# Patient Record
Sex: MALE | Race: WHITE | NOT HISPANIC OR LATINO | Employment: OTHER | URBAN - METROPOLITAN AREA
[De-identification: names, ages, dates, MRNs, and addresses within clinical notes are randomized per-mention and may not be internally consistent; named-entity substitution may affect disease eponyms.]

---

## 2018-05-17 ENCOUNTER — HOSPITAL ENCOUNTER (EMERGENCY)
Facility: HOSPITAL | Age: 53
Discharge: HOME | End: 2018-05-18
Attending: EMERGENCY MEDICINE | Admitting: EMERGENCY MEDICINE
Payer: COMMERCIAL

## 2018-05-17 DIAGNOSIS — G43.909 MIGRAINE: Primary | ICD-10-CM

## 2018-05-17 PROCEDURE — 96365 THER/PROPH/DIAG IV INF INIT: CPT

## 2018-05-17 PROCEDURE — 96375 TX/PRO/DX INJ NEW DRUG ADDON: CPT

## 2018-05-17 RX ORDER — MAGNESIUM SULFATE HEPTAHYDRATE 40 MG/ML
2 INJECTION, SOLUTION INTRAVENOUS ONCE
Status: COMPLETED | OUTPATIENT
Start: 2018-05-17 | End: 2018-05-17

## 2018-05-17 RX ORDER — KETOROLAC TROMETHAMINE 30 MG/ML
30 INJECTION, SOLUTION INTRAMUSCULAR; INTRAVENOUS ONCE
Status: COMPLETED | OUTPATIENT
Start: 2018-05-17 | End: 2018-05-17

## 2018-05-17 RX ORDER — CYCLOBENZAPRINE HCL 10 MG
10 TABLET ORAL 3 TIMES DAILY PRN
COMMUNITY

## 2018-05-17 RX ORDER — METOCLOPRAMIDE HYDROCHLORIDE 5 MG/ML
10 INJECTION INTRAMUSCULAR; INTRAVENOUS ONCE
Status: COMPLETED | OUTPATIENT
Start: 2018-05-17 | End: 2018-05-17

## 2018-05-17 RX ORDER — DIPHENHYDRAMINE HYDROCHLORIDE 50 MG/ML
25 INJECTION INTRAMUSCULAR; INTRAVENOUS ONCE
Status: COMPLETED | OUTPATIENT
Start: 2018-05-17 | End: 2018-05-17

## 2018-05-17 RX ADMIN — DIPHENHYDRAMINE HYDROCHLORIDE 25 MG: 50 INJECTION, SOLUTION INTRAMUSCULAR; INTRAVENOUS at 23:14

## 2018-05-17 RX ADMIN — SODIUM CHLORIDE 1000 ML: 0.9 INJECTION, SOLUTION INTRAVENOUS at 22:52

## 2018-05-17 RX ADMIN — MAGNESIUM SULFATE HEPTAHYDRATE 2 G: 40 INJECTION, SOLUTION INTRAVENOUS at 22:56

## 2018-05-17 RX ADMIN — KETOROLAC TROMETHAMINE 30 MG: 30 INJECTION, SOLUTION INTRAMUSCULAR at 22:52

## 2018-05-17 RX ADMIN — METOCLOPRAMIDE HYDROCHLORIDE 10 MG: 5 INJECTION INTRAMUSCULAR; INTRAVENOUS at 22:55

## 2018-05-18 VITALS
BODY MASS INDEX: 33.97 KG/M2 | OXYGEN SATURATION: 98 % | TEMPERATURE: 98.2 F | WEIGHT: 230 LBS | DIASTOLIC BLOOD PRESSURE: 68 MMHG | HEART RATE: 70 BPM | RESPIRATION RATE: 16 BRPM | SYSTOLIC BLOOD PRESSURE: 134 MMHG

## 2018-05-18 PROCEDURE — 99283 EMERGENCY DEPT VISIT LOW MDM: CPT

## 2018-05-18 NOTE — DISCHARGE INSTRUCTIONS
Migraine Headache   WHAT YOU SHOULD KNOW:   A migraine is a severe headache  The pain can be so severe that it interferes with your daily activities  A migraine can last a few hours up to several days  The exact cause of migraines is not known  It may be caused by changes in your body chemicals and extra sensitive nerves in your brain  AFTER YOU LEAVE:   Medicines:  Take medicine as soon as you feel a migraine begin  · Pain medicine: You may need medicine to take away or decrease pain  You may need a doctor's order for this medicine  Do not wait until the pain is severe before you take your medicine  · Migraine medicines: These are used to help prevent a migraine or stop it once it starts  · Antinausea medicine: This medicine may be given to calm your stomach and to help prevent vomiting  They can also help relieve pain  · Take your medicine as directed  Call your healthcare provider if you think your medicine is not helping or if you have side effects  Tell him if you are allergic to any medicine  Keep a list of the medicines, vitamins, and herbs you take  Include the amounts, and when and why you take them  Bring the list or the pill bottles to follow-up visits  Carry your medicine list with you in case of an emergency  Manage your symptoms:   · Rest:  Rest in a dark, quiet room  This will help decrease your pain  · Ice:  Ice helps decrease pain  Use an ice pack or put crushed ice in a plastic bag  Cover the ice pack with a towel and place it on your head where it hurts for 15 to 20 minutes every hour  · Heat:  Heat helps decrease pain and muscle spasms  Use a small towel dampened with warm water or a heating pad, or sit in a warm bath  Apply heat on the area for 20 to 30 minutes every 2 hours  You may alternate heat and ice  Keep a headache diary:  Write down when your migraines start and stop  Include your symptoms and what you were doing when a migraine began   Record what you ate or drank for 24 hours before the migraine started  Describe the pain and where it hurts  Keep track of what you did to treat your migraine and whether it worked  Follow up with your primary healthcare provider or neurologist as directed:  Bring your headache diary with you when you see your primary healthcare provider  Write down your questions so you remember to ask them during your visits  Prevent another migraine:   · Do not smoke: If you smoke, it is never too late to quit  Tobacco smoke can trigger a migraine  It can also cause heart disease, lung disease, cancer, and other health problems  Quitting smoking will improve your health and the health of those around you  If you smoke, ask for information about how to stop  · Do not drink alcohol:  Alcohol can trigger a migraine  It can also interfere with the medicines used to treat your migraine  · Get regular exercise:  Exercise may help prevent migraines  Talk to your primary healthcare provider about the best exercise plan for you  · Manage stress:  Stress may trigger a migraine  Learn new ways to relax, such as deep breathing  · Stick to a sleep schedule:  Go to bed and get up at the same time each day  · Eat regular meals:  Include healthy foods such as include fruit, vegetables, whole-grain breads, low-fat dairy products, beans, lean meat, and fish  Avoid trigger foods like chocolate, hard cheese, and red wine  Foods that contain gluten, nitrates, MSG, or artificial sweeteners may also trigger migraines  Caffeine, which is often used to treat migraines, can also trigger them  Contact your primary healthcare provider or neurologist if:   · You have a fever  · Your migraines interfere with your daily activities  · Your medicines or treatments stop working  · You have questions about your condition or care    Seek care immediately or call 911 if:   · You have a headache that seems different or much worse than your usual migraine headache  · You have a severe headache with a fever or a stiff neck  · You have new problems with speech, vision, balance, or movement  · You feel like you are going to faint, you become confused, or you have a seizure  © 2014 [address] is for End User's use only and may not be sold, redistributed or otherwise used for commercial purposes  All illustrations and images included in CareNotes® are the copyrighted property of [employer]  or [employer]  The above information is an  only  It is not intended as medical advice for individual conditions or treatments  Talk to your doctor, nurse or pharmacist before following any medical regimen to see if it is safe and effective for you

## 2018-05-18 NOTE — ED PROVIDER NOTES
History  Chief Complaint   Patient presents with    Migraine     for 2 days    Nausea     for two days     65-year-old male with a history of migraines presents with complaints of 2 days of migraine headache with photophobia  Patient states he has not had a migraine for almost a year  Normally managed with over-the-counter medication  No fever, no trauma, no change in vision, no recent travel, + nausea but no vomiting  History of diabetes, blood sugar 140s        History provided by:  Patient  Migraine   Severity:  Severe  Onset quality:  Gradual  Duration:  2 days  Timing:  Constant  Progression:  Unchanged  Chronicity:  Recurrent  Relieved by:  Nothing  Worsened by:  Lights and sounds  Ineffective treatments:  Over-the-counter medication  Associated symptoms: headaches and nausea    Associated symptoms: no chest pain, no congestion, no cough, no diarrhea, no ear pain, no fatigue, no fever, no loss of consciousness, no myalgias, no rash, no rhinorrhea, no shortness of breath, no sore throat, no vomiting and no wheezing    Nausea   The primary symptoms include nausea  Primary symptoms do not include fever, fatigue, vomiting, diarrhea, myalgias or rash  The illness does not include chills  Prior to Admission Medications   Prescriptions Last Dose Informant Patient Reported? Taking?    DULoxetine (CYMBALTA) 30 mg delayed release capsule 5/17/2018 at Unknown time  Yes Yes   Sig: Take 90 mg by mouth daily   FENOFIBRATE PO 5/17/2018 at Unknown time  Yes Yes   Sig: Take by mouth   Glucos-Giuseppe-MSM-Is-I-UxNw-SeCu (DURAFLEX PO) Past Week at Unknown time  Yes Yes   Sig: Take 10 mg by mouth daily   METFORMIN HCL ER, MOD, PO 5/17/2018 at Unknown time  Yes Yes   Sig: Take by mouth   cyclobenzaprine (FLEXERIL) 10 mg tablet 5/17/2018 at Unknown time  Yes Yes   Sig: Take 10 mg by mouth 3 (three) times a day as needed for muscle spasms   naproxen (NAPROSYN) 500 mg tablet   No No   Sig: Take 1 tablet by mouth 2 (two) times a day with meals for 10 days      Facility-Administered Medications: None       Past Medical History:   Diagnosis Date    Diabetes mellitus ([facility] [address] )     Migraine        Past Surgical History:   Procedure Laterality Date    HIP SURGERY      KNEE SURGERY      SHOULDER SURGERY         History reviewed  No pertinent family history  I have reviewed and agree with the history as documented  Social History   Substance Use Topics    Smoking status: Current Every Day Smoker     Types: Cigars    Smokeless tobacco: Never Used    Alcohol use No        Review of Systems   Constitutional: Negative for chills, fatigue and fever  HENT: Negative for congestion, ear pain, rhinorrhea and sore throat  Respiratory: Negative for cough, shortness of breath and wheezing  Cardiovascular: Negative for chest pain  Gastrointestinal: Positive for nausea  Negative for diarrhea and vomiting  Genitourinary: Negative  Musculoskeletal: Negative  Negative for myalgias and neck pain  Skin: Negative for rash  Neurological: Positive for headaches  Negative for loss of consciousness and speech difficulty  Hematological: Negative  Psychiatric/Behavioral: Negative  All other systems reviewed and are negative  Physical Exam  ED Triage Vitals [05/17/18 2209]   Temperature Pulse Respirations Blood Pressure SpO2   98 5 °F (36 9 °C) 75 20 140/87 98 %      Temp Source Heart Rate Source Patient Position - Orthostatic VS BP Location FiO2 (%)   Tympanic Monitor Sitting Right arm --      Pain Score       Worst Possible Pain           Orthostatic Vital Signs  Vitals:    05/17/18 2209   BP: 140/87   Pulse: 75   Patient Position - Orthostatic VS: Sitting       Physical Exam   Constitutional: He is oriented to person, place, and time  He appears well-developed and well-nourished  He appears distressed  HENT:   Head: Normocephalic and atraumatic     Right Ear: External ear normal    Left Ear: External ear normal    Nose: Nose normal    Mouth/Throat: Oropharynx is clear and moist    Eyes: Conjunctivae, EOM and lids are normal    Photophobia   Neck: Normal range of motion  Neck supple  Cardiovascular: Normal rate, regular rhythm, normal heart sounds and intact distal pulses  Pulmonary/Chest: Effort normal and breath sounds normal    Abdominal: Soft  Bowel sounds are normal    Musculoskeletal: Normal range of motion  Neurological: He is alert and oriented to person, place, and time  Skin: Skin is warm  Capillary refill takes less than 2 seconds  Psychiatric: He has a normal mood and affect  His behavior is normal  Judgment and thought content normal    Nursing note and vitals reviewed  ED Medications  Medications   ketorolac (TORADOL) injection 30 mg (30 mg Intravenous Given 5/17/18 2252)   sodium chloride 0 9 % bolus 1,000 mL (0 mL Intravenous Stopped 5/17/18 2352)   magnesium sulfate 2 g/50 mL IVPB (premix) 2 g (0 g Intravenous Stopped 5/17/18 2352)   metoclopramide (REGLAN) injection 10 mg (10 mg Intravenous Given 5/17/18 2255)   diphenhydrAMINE (BENADRYL) injection 25 mg (25 mg Intravenous Given 5/17/18 2314)       Diagnostic Studies  Results Reviewed     None                 No orders to display              Procedures  Procedures       Phone Contacts  ED Phone Contact    ED Course                               MDM  CritCare Time    Disposition  Final diagnoses:   Migraine     Time reflects when diagnosis was documented in both MDM as applicable and the Disposition within this note     Time User Action Codes Description Comment    5/18/2018 12:07 AM Rita Eaton Add [[id number]] Migraine       ED Disposition     ED Disposition Condition Comment    Discharge  Cory Altamirano discharge to home/self care      Condition at discharge: Stable        Follow-up Information     Follow up With Specialties Details Why Contact Tristen Kebede DO Family Medicine Schedule an appointment as soon as possible for a visit As needed [address] [facility]  [phone]          Patient's Medications   Discharge Prescriptions    No medications on file     No discharge procedures on file      ED Provider  Electronically Signed by           Maddy Mtz MD  05/18/18 0018

## 2019-09-24 ENCOUNTER — TRANSCRIBE ORDERS (OUTPATIENT)
Dept: ADMINISTRATIVE | Facility: HOSPITAL | Age: 54
End: 2019-09-24

## 2019-09-24 ENCOUNTER — HOSPITAL ENCOUNTER (OUTPATIENT)
Dept: RADIOLOGY | Facility: HOSPITAL | Age: 54
Discharge: HOME | End: 2019-09-24
Payer: COMMERCIAL

## 2019-09-24 DIAGNOSIS — I83.811 VARICOSE VEINS OF RIGHT LOWER EXTREMITY WITH PAIN: Primary | ICD-10-CM

## 2019-09-24 DIAGNOSIS — I83.811 VARICOSE VEINS OF RIGHT LOWER EXTREMITY WITH PAIN: ICD-10-CM

## 2019-09-24 PROCEDURE — 93971 EXTREMITY STUDY: CPT | Performed by: SURGERY

## 2019-09-24 PROCEDURE — 93971 EXTREMITY STUDY: CPT

## 2021-03-10 DIAGNOSIS — Z23 ENCOUNTER FOR IMMUNIZATION: ICD-10-CM

## 2022-07-19 ENCOUNTER — OFFICE VISIT (OUTPATIENT)
Dept: DERMATOLOGY | Age: 57
End: 2022-07-19
Payer: COMMERCIAL

## 2022-07-19 VITALS — BODY MASS INDEX: 30.21 KG/M2 | WEIGHT: 211 LBS | HEIGHT: 70 IN | TEMPERATURE: 96.4 F

## 2022-07-19 DIAGNOSIS — L57.0 KERATOSIS, ACTINIC: ICD-10-CM

## 2022-07-19 DIAGNOSIS — Z85.828 HISTORY OF BASAL CELL CARCINOMA: Primary | ICD-10-CM

## 2022-07-19 DIAGNOSIS — L28.1 PRURIGO NODULARIS: ICD-10-CM

## 2022-07-19 PROCEDURE — 17003 DESTRUCT PREMALG LES 2-14: CPT | Performed by: DERMATOLOGY

## 2022-07-19 PROCEDURE — 17000 DESTRUCT PREMALG LESION: CPT | Performed by: DERMATOLOGY

## 2022-07-19 PROCEDURE — 99203 OFFICE O/P NEW LOW 30 MIN: CPT | Performed by: DERMATOLOGY

## 2022-07-19 NOTE — PATIENT INSTRUCTIONS
MELANOCYTIC NEVI ("Moles")    Physical Exam:  Anatomic Location Affected:   Mostly on sun-exposed areas of the trunk and extremities  Morphological Description:  Scattered, 1-4mm round to ovoid, symmetrical-appearing, even bordered, skin colored to dark brown macules/papules, mostly in sun-exposed areas  Pertinent Positives:  Pertinent Negatives: Additional History of Present Condition:      Assessment and Plan:  Based on a thorough discussion of this condition and the management approach to it (including a comprehensive discussion of the known risks, side effects and potential benefits of treatment), the patient (family) agrees to implement the following specific plan:  When outside we recommend using a wide brim hat, sunglasses, long sleeve and pants, sunscreen with SPF 71+ with reapplication every 2 hours, or SPF specific clothing   Benign, reassured  Annual skin check     Melanocytic Nevi  Melanocytic nevi ("moles") are tan or brown, raised or flat areas of the skin which have an increased number of melanocytes  Melanocytes are the cells in our body which make pigment and account for skin color  Some moles are present at birth (I e , "congenital nevi"), while others come up later in life (i e , "acquired nevi")  The sun can stimulate the body to make more moles  Sunburns are not the only thing that triggers more moles  Chronic sun exposure can do it too  Clinically distinguishing a healthy mole from melanoma may be difficult, even for experienced dermatologists  The "ABCDE's" of moles have been suggested as a means of helping to alert a person to a suspicious mole and the possible increased risk of melanoma  The suggestions for raising alert are as follows:    Asymmetry: Healthy moles tend to be symmetric, while melanomas are often asymmetric  Asymmetry means if you draw a line through the mole, the two halves do not match in color, size, shape, or surface texture   Asymmetry can be a result of rapid enlargement of a mole, the development of a raised area on a previously flat lesion, scaling, ulceration, bleeding or scabbing within the mole  Any mole that starts to demonstrate "asymmetry" should be examined promptly by a board certified dermatologist      Border: Healthy moles tend to have discrete, even borders  The border of a melanoma often blends into the normal skin and does not sharply delineate the mole from normal skin  Any mole that starts to demonstrate "uneven borders" should be examined promptly by a board certified dermatologist      Color: Healthy moles tend to be one color throughout  Melanomas tend to be made up of different colors ranging from dark black, blue, white, or red  Any mole that demonstrates a color change should be examined promptly by a board certified dermatologist      Diameter: Healthy moles tend to be smaller than 0 6 cm in size; an exception are "congenital nevi" that can be larger  Melanomas tend to grow and can often be greater than 0 6 cm (1/4 of an inch, or the size of a pencil eraser)  This is only a guideline, and many normal moles may be larger than 0 6 cm without being unhealthy  Any mole that starts to change in size (small to bigger or bigger to smaller) should be examined promptly by a board certified dermatologist      Evolving: Healthy moles tend to "stay the same "  Melanomas may often show signs of change or evolution such as a change in size, shape, color, or elevation    Any mole that starts to itch, bleed, crust, burn, hurt, or ulcerate or demonstrate a change or evolution should be examined promptly by a board certified dermatologist         HISTORY OF BASAL CELL CARCINOMA    Assessment and Plan:  Based on a thorough discussion of this condition and the management approach to it (including a comprehensive discussion of the known risks, side effects and potential benefits of treatment), the patient (family) agrees to implement the following specific plan:  When outside we recommend using a wide brim hat, sunglasses, long sleeve and pants, sunscreen with SPF 68+ with reapplication every 2 hours, or SPF specific clothing     How can basal cell carcinoma be prevented? The most important way to prevent BCC is to avoid sunburn  This is especially important in childhood and early life  Fair skinned individuals and those with a personal or family history of BCC should protect their skin from sun exposure daily, year-round and lifelong  Stay indoors or under the shade in the middle of the day   Wear covering clothing   Apply high protection factor SPF50+ broad-spectrum sunscreens generously to exposed skin if outdoors   Avoid indoor tanning (sun beds, solaria)  Oral nicotinamide (vitamin B3) in a dose of 500 mg twice daily may reduce the number and severity of BCCs  What is the outlook for basal cell carcinoma? Most BCCs are cured by treatment  Cure is most likely if treatment is undertaken when the lesion is small  About 50% of people with BCC develop a second one within 3 years of the first  They are also at increased risk of other skin cancers, especially melanoma  Regular self-skin examinations and long-term annual skin checks by an experienced health professional are recommended  ACTINIC KERATOSIS    Assessment and Plan:  Based on a thorough discussion of this condition and the management approach to it (including a comprehensive discussion of the known risks, side effects and potential benefits of treatment), the patient (family) agrees to implement the following specific plan:    Treatment of liquid nitrogen applied today's  For the spots treated with liquid nitrogen today, expect them to stay red and become crusty over the course of the next 7-10 days, and then the crust should fall off  If you develop a small blister in the area, this is normal  Use Vaseline for irritation      Actinic keratoses are very common on sites repeatedly exposed to the sun, especially the backs of the hands and the face, most often affecting the ears, nose, cheeks, upper lip, vermilion of the lower lip, temples, forehead and balding scalp  In severely chronically sun-damaged individuals, they may also be found on the upper trunk, upper and lower limbs, and dorsum of feet  We discussed the theoretical premalignant (pre-cancerous) nature and etiology of these growths  We discussed the prevailing notion that actinic keratoses are a reflection of abnormal skin cell development due to DNA damage by short wavelength UVB  They are more likely to appear if the immune function is poor, due to aging, recent sun exposure, predisposing disease or certain drugs  We discussed that the main concern is that actinic keratoses may predispose to squamous cell carcinoma  It is rare for a solitary actinic keratosis to evolve to squamous cell carcinoma (SCC), but the risk of SCC occurring at some stage in a patient with more than 10 actinic keratoses is thought to be about 10 to 15%  A tender, thickened, ulcerated or enlarging actinic keratosis is suspicious of SCC  Actinic keratoses may be prevented by strict sun protection  If already present, keratoses may improve with a very high sun protection factor (50+) broad-spectrum sunscreen applied at least daily to affected areas, year-round  We recommend that UPF-rated clothing and hats and sunglasses be worn whenever possible and that a sunscreen-moisturizer combination product such as Neutrogena Daily Defense be applied at least three times a day      We performed a thorough discussion of treatment options and specific risk/benefits/alternatives including but not limited to medical field treatment with medications such as the following:    Topical field area medications such as 5-fluorouracil or Aldara (specifically, the trouble with long-term compliance, blistering and local skin reaction versus the convenience of at-home therapy and that field therapy gets what is not yet seen)  Cryotherapy (specifically, local pain, scarring, dyspigmentation, blistering, possible superinfection, and treats only what we see versus directed treatment today)  Photodynamic therapy (specifically, local pain, scarring, dyspigmentation, blistering, possible superinfection, need to schedule for a later date, and time spent in the office versus field therapy that gets what is not yet seen)  PROCEDURE:  DESTRUCTION OF PRE-MALIGNANT LESIONS  After a thorough discussion of treatment options and risk/benefits/alternatives (including but not limited to local pain, scarring, dyspigmentation, blistering, and possible superinfection), verbal and written consent were obtained and the aforementioned lesions were treated on with cryotherapy using liquid nitrogen x 1 cycle for 5-10 seconds  The patient tolerated the procedure well, and after-care instructions were provided  PRURIGO NODULARIS    Assessment and Plan:  Based on a thorough discussion of this condition and the management approach to it (including a comprehensive discussion of the known risks, side effects and potential benefits of treatment), the patient (family) agrees to implement the following specific plan:  Clobetasol 0 05% cream apply topically to the forearms 2 times daily for 4 weeks  What is nodular prurigo? Nodular prurigo is a skin condition characterised by very itchy firm lumps  It is the most severe form of prurigo  It is not known why these lumps appear  It is also called prurigo nodularis  Nodular prurigo is very difficult to treat effectively  Who gets nodular prurigo? Nodular prurigo can occur at all ages but mainly in adults aged 19-56 years  Both sexes are equally affected  Up to 80% of patients have a personal or family history of atopic dermatitis, asthma or hay fever (compared to about 25% of the normal population)   It has been associated with internal disease including iron deficiency anaemia, chronic renal failure, gluten enteropathy, HIV infection and many other diverse conditions  What is the cause of nodular prurigo? The cause of nodular prurigo is unknown  It is uncertain whether scratching leads to the nodules or if the nodules appear before they are scratched  The reason for the nodules, the inflammation and the increased activity and size of nerves in the skin is under investigation but remains unknown  Nodular prurigo may commence as an insect bite reaction or another form of dermatitis  In some cases, nodular prurigo has been associated with brachioradial pruritus, which is due to compression or traction of spinal nerves  This theory may explain why local treatment is not always successful  It has been speculated that widespread nodular prurigo may also follow sensitisation of the spinal nerves and that the nodules appear because of scratching  What are the clinical features of nodular prurigo? The individual prurigo nodule is a firm lump, 1-3 cm in diameter, often with a raised warty surface  The early lesion may start as a smaller red itchy bump  Crusting and scaling may cover recently scratched lesions  Older lesions may be darker or paler than surrounding skin  The skin in between the nodules is often dry  The itch is often very intense, often for hours on end, leading to vigorous scratching and sometimes secondary infection  Nodular prurigo lesions are usually grouped and numerous but may vary in number from 2-200  Nodular prurigo tends to be symmetrically distributed  They usually start on the lower arms and legs and are worse on the outer aspects  The trunk, face and even palms can also be affected  Sometimes the prurigo nodules are most obvious on the cape area (neck, shoulders and upper arms)  New nodules appear from time to time, but existing nodules may regress spontaneously to leave scars   Nodular prurigo often runs a long course and can lead to significant stress and depression  How is nodular prurigo diagnosed? In most cases, the diagnosis is made clinically due to the degree of itch and the typical appearance of the nodules  A skin biopsy may be useful to confirm the diagnosis of nodular prurigo  Under the microscope, the skin is enormously thickened and may appear quite abnormal, sometimes resembling squamous cell skin cancer  The nerve fibres and nerve endings in the skin are markedly increased in size  The skin is inflamed and there is an increased number of neural mediators known to cause itching and nerve growth  Direct immunofluorescence looking for antibody deposition in the skin is usually negative  Rarely, the blistering disease bullous pemphigoid can present as nodular prurigo (pemphigoid nodularis)  In this case, immunofluorescence reveals immunoglobulins in the basement membrane zone below the epidermis  The prurigo nodules can be present for weeks or months before any blisters appear  It is important to identify underlying diseases that are associated with nodular prurigo; blood tests may include full blood count, liver, kidney and thyroid function tests  Patch testing may be worthwhile if contact allergy is considered possible  What is the treatment for nodular prurigo? Unfortunately, nodular prurigo is one of the more resistant conditions skin specialists are called upon to treat  Local treatments that may be tried include:  Emollients applied liberally and frequently to cool and soothe itchy skin - menthol or phenol may be added  Oral antihistamines at night to reduce itch and allow sleep  Ultrapotent topical steroid creams  To enhance their effect, apply under occlusion; cover with a plastic or hydrocolloid adhesive dressing and leave this in place for several days  Corticosteroid injections (triamcinolone acetonide 10-40 mg/mL) into thicker nodules     Coal tar ointment as a steroid alternative  Calcipotriol ointment (topical vitamin D3), usually used for psoriasis, can be applied twice daily  Capsaicin cream, which induces itching and burning until eventually, the itch stops completely -- it requires repeated applications four to six times daily  Cryotherapy, which may shrink the nodules and reduce their itch  Treatments with pulsed dye laser, which may reduce the vascularity of individual lesions  Antiseptic or antibiotic ointment may be used on infected nodules, and oral antibiotics (usually flucloxacillin) are indicated for significant secondary bacterial infection (cellulitis)  Systemic treatments that may be helpful for more severe disease are listed below, in no particular order  Combination treatment is frequently recommended  Phototherapy (UVB and PUVA)   Tricyclic antidepressants such as amitriptyline or doxepin   Anticonvulsants used for neuropathic pain and itch, such as gabapentin or pregabalin   Naltrexone, an opiate antagonist (this counteracts the narcotic effect of morphine, heroin and similar drugs), has been reported to reduce itching in some subjects  Oral steroids   Methotrexate   Thalidomide, which is reserved for very severe cases and may be difficult to access  Ciclosporin, which may reduce the lumps and the itching but its use is limited by side effects  Systemic retinoids such as acitretin or isotretinoin, which may shrink the nodules and reduce the severity of the itch

## 2022-07-19 NOTE — PROGRESS NOTES
[address] [facility] Note     Patient Name: Samantha Briseno  Encounter Date: 7/19/22     Have you been cared for by a [facility] Dermatologist in the last 3 years and, if so, which one? No    · Have you traveled outside of the [address] [place] [address] in the past 3 months or outside of the [facility] area in the last 2 weeks? No     May we call your Preferred Phone number to discuss your specific medical information? Yes     May we leave a detailed message that includes your specific medical information? Yes      Today's Chief Concerns:   Concern #1:  Skin exam   Concern #2:      Past Medical History:  Have you personally ever had or currently have any of the following? · Skin cancer (such as Melanoma, Basal Cell Carcinoma, Squamous Cell Carcinoma? (If Yes, please provide more detail)- YES, BCC's  · Eczema: No  · Psoriasis: No  · HIV/AIDS: No  · Hepatitis B or C: No  · Tuberculosis: No  · Systemic Immunosuppression such as Diabetes, Biologic or Immunotherapy, Chemotherapy, Organ Transplantation, Bone Marrow Transplantation (If YES, please provide more detail): YES, diabetes  · Radiation Treatment (If YES, please provide more detail): No  · Any other major medical conditions/concerns? (If Yes, which types)- No    Social History:     What is/was your primary occupation? retired        Family History:  Have any of your "first degree relatives" (parent, brother, sister, or child) had any of the following       · Skin cancer such as Melanoma or Merkel Cell Carcinoma or Pancreatic Cancer? No  · Eczema, Asthma, Hay Fever or Seasonal Allergies: No  · Psoriasis or Psoriatic Arthritis: No  · Do any other medical conditions seem to run in your family? If Yes, what condition and which relatives?   No    Current Medications:   (please update all dermatological medications before printing patient's AVS!)      Current Outpatient Medications:     DULoxetine (CYMBALTA) 30 mg delayed release capsule, Take 90 mg by mouth daily, Disp: , Rfl:     FENOFIBRATE PO, Take by mouth, Disp: , Rfl:     METFORMIN HCL ER, MOD, PO, Take by mouth, Disp: , Rfl:     cyclobenzaprine (FLEXERIL) 10 mg tablet, Take 10 mg by mouth 3 (three) times a day as needed for muscle spasms, Disp: , Rfl:     Glucos-Giuseppe-MSM-Zn-C-ZmUf-SeCu (DURAFLEX PO), Take 10 mg by mouth daily, Disp: , Rfl:     naproxen (NAPROSYN) 500 mg tablet, Take 1 tablet by mouth 2 (two) times a day with meals for 10 days, Disp: 20 tablet, Rfl: 0      Review of Systems:  Have you recently had or currently have any of the following? If YES, what are you doing for the problem? · Fever, chills or unintended weight loss: No  · Sudden loss or change in your vision: No  · Nausea, vomiting or blood in your stool: No  · Painful or swollen joints: No  · Wheezing or cough: No  · Changing mole or non-healing wound: No  · Nosebleeds: No  · Excessive sweating: No  · Easy or prolonged bleeding? No  · Over the last 2 weeks, how often have you been bothered by the following problems? · Taking little interest or pleasure in doing things: 1 - Not at All  · Feeling down, depressed, or hopeless: 1 - Not at All  · Rapid heartbeat with epinephrine:  No        · Any known allergies?       Allergies   Allergen Reactions    Biaxin [Clarithromycin]     Duricef [Cefadroxil]     Penicillins Hives, Rash and Swelling   ·       Physical Exam:     Was a chaperone (Derm Clinical Assistant) present throughout the entire Physical Exam? Yes        CONSTITUTIONAL:   Vitals:    07/19/22 1322   Temp: (!) 96 4 °F (35 8 °C)   TempSrc: Temporal   Weight: 95 7 kg (211 lb)   Height: 5' 9 5" (1 765 m)         PSYCH: Normal mood and affect  EYES: Normal conjunctiva  ENT: Normal lips and oral mucosa  CARDIOVASCULAR: No edema  RESPIRATORY: Normal respirations  HEME/LYMPH/IMMUNO:  No regional lymphadenopathy except as noted below in "ASSESSMENT AND PLAN BY DIAGNOSIS"    SKIN:  FULL ORGAN SYSTEM EXAM   Hair, Scalp, Ears, Face Normal except as noted below in Assessment   Neck, Cervical Chain Nodes Normal except as noted below in Assessment   Right Arm/Hand/Fingers Normal except as noted below in Assessment   Left Arm/Hand/Fingers Normal except as noted below in Assessment   Chest/Breasts/Axillae Viewed areas Normal except as noted below in Assessment   Abdomen, Umbilicus Normal except as noted below in Assessment   Back/Spine Normal except as noted below in Assessment   Groin/Genitalia/Buttocks Normal except as noted below in Assessment   Right Leg, Foot, Toes Normal except as noted below in Assessment   Left Leg, Foot, Toes Normal except as noted below in Assessment        Assessment and Plan by Diagnosis:    History of Present Condition:     Duration:  How long has this been an issue for you?    o  patient here for skin exam history of BCC'S there's a few spots patient requesting to be examined   Location Affected:  Where on the body is this affecting you?    o  n/a   Quality:  Is there any bleeding, pain, itch, burning/irritation, or redness associated with the skin lesion?    o  n/a   Severity:  Describe any bleeding, pain, itch, burning/irritation, or redness on a scale of 1 to 10 (with 10 being the worst)  o  n/a   Timing:  Does this condition seem to be there pretty constantly or do you notice it more at specific times throughout the day?     o  consistent   Context:  Have you ever noticed that this condition seems to be associated with specific activities you do?    o  n/a   Modifying Factors:    o Anything that seems to make the condition worse?    -  nothing  o What have you tried to do to make the condition better?    -  nothing   Associated Signs and Symptoms:  Does this skin lesion seem to be associated with any of the following:      MELANOCYTIC NEVI ("Moles")    Physical Exam:   Anatomic Location Affected:   Mostly on sun-exposed areas of the trunk and extremities   Morphological Description:  Scattered, 1-4mm round to ovoid, symmetrical-appearing, even bordered, skin colored to dark brown macules/papules, mostly in sun-exposed areas   Pertinent Positives:   Pertinent Negatives: Additional History of Present Condition:      Assessment and Plan:  Based on a thorough discussion of this condition and the management approach to it (including a comprehensive discussion of the known risks, side effects and potential benefits of treatment), the patient (family) agrees to implement the following specific plan:   When outside we recommend using a wide brim hat, sunglasses, long sleeve and pants, sunscreen with SPF 07+ with reapplication every 2 hours, or SPF specific clothing    Benign, reassured   Annual skin check     Melanocytic Nevi  Melanocytic nevi ("moles") are tan or brown, raised or flat areas of the skin which have an increased number of melanocytes  Melanocytes are the cells in our body which make pigment and account for skin color  Some moles are present at birth (I e , "congenital nevi"), while others come up later in life (i e , "acquired nevi")  The sun can stimulate the body to make more moles  Sunburns are not the only thing that triggers more moles  Chronic sun exposure can do it too  Clinically distinguishing a healthy mole from melanoma may be difficult, even for experienced dermatologists  The "ABCDE's" of moles have been suggested as a means of helping to alert a person to a suspicious mole and the possible increased risk of melanoma  The suggestions for raising alert are as follows:    Asymmetry: Healthy moles tend to be symmetric, while melanomas are often asymmetric  Asymmetry means if you draw a line through the mole, the two halves do not match in color, size, shape, or surface texture   Asymmetry can be a result of rapid enlargement of a mole, the development of a raised area on a previously flat lesion, scaling, ulceration, bleeding or scabbing within the mole  Any mole that starts to demonstrate "asymmetry" should be examined promptly by a board certified dermatologist      Border: Healthy moles tend to have discrete, even borders  The border of a melanoma often blends into the normal skin and does not sharply delineate the mole from normal skin  Any mole that starts to demonstrate "uneven borders" should be examined promptly by a board certified dermatologist      Color: Healthy moles tend to be one color throughout  Melanomas tend to be made up of different colors ranging from dark black, blue, white, or red  Any mole that demonstrates a color change should be examined promptly by a board certified dermatologist      Diameter: Healthy moles tend to be smaller than 0 6 cm in size; an exception are "congenital nevi" that can be larger  Melanomas tend to grow and can often be greater than 0 6 cm (1/4 of an inch, or the size of a pencil eraser)  This is only a guideline, and many normal moles may be larger than 0 6 cm without being unhealthy  Any mole that starts to change in size (small to bigger or bigger to smaller) should be examined promptly by a board certified dermatologist      Evolving: Healthy moles tend to "stay the same "  Melanomas may often show signs of change or evolution such as a change in size, shape, color, or elevation  Any mole that starts to itch, bleed, crust, burn, hurt, or ulcerate or demonstrate a change or evolution should be examined promptly by a board certified dermatologist         HISTORY OF BASAL CELL CARCINOMA    Physical Exam:   Anatomic Location Affected:  Left calf, face hands   Morphological Description of scar:  Well healed   Suspected Recurrence: No   Pertinent Positives:   Pertinent Negatives:       Additional History of Present Condition:  History of basal cell carcinoma with no sign of recurrence    Assessment and Plan:  Based on a thorough discussion of this condition and the management approach to it (including a comprehensive discussion of the known risks, side effects and potential benefits of treatment), the patient (family) agrees to implement the following specific plan:   When outside we recommend using a wide brim hat, sunglasses, long sleeve and pants, sunscreen with SPF 13+ with reapplication every 2 hours, or SPF specific clothing     How can basal cell carcinoma be prevented? The most important way to prevent BCC is to avoid sunburn  This is especially important in childhood and early life  Fair skinned individuals and those with a personal or family history of BCC should protect their skin from sun exposure daily, year-round and lifelong   Stay indoors or under the shade in the middle of the day    Wear covering clothing    Apply high protection factor SPF50+ broad-spectrum sunscreens generously to exposed skin if outdoors    Avoid indoor tanning (sun beds, solaria)   Oral nicotinamide (vitamin B3) in a dose of 500 mg twice daily may reduce the number and severity of BCCs  What is the outlook for basal cell carcinoma? Most BCCs are cured by treatment  Cure is most likely if treatment is undertaken when the lesion is small  About 50% of people with BCC develop a second one within 3 years of the first  They are also at increased risk of other skin cancers, especially melanoma  Regular self-skin examinations and long-term annual skin checks by an experienced health professional are recommended        ACTINIC KERATOSIS    Physical Exam:   Anatomic Location Affected:  cheeks   Morphological Description:  Scaly pink papules    Additional History of Present Condition:  History of BCC's    Assessment and Plan:  Based on a thorough discussion of this condition and the management approach to it (including a comprehensive discussion of the known risks, side effects and potential benefits of treatment), the patient (family) agrees to implement the following specific plan:     Treatment of liquid nitrogen applied today's   For the spots treated with liquid nitrogen today, expect them to stay red and become crusty over the course of the next 7-10 days, and then the crust should fall off  If you develop a small blister in the area, this is normal  Use Vaseline for irritation  Actinic keratoses are very common on sites repeatedly exposed to the sun, especially the backs of the hands and the face, most often affecting the ears, nose, cheeks, upper lip, vermilion of the lower lip, temples, forehead and balding scalp  In severely chronically sun-damaged individuals, they may also be found on the upper trunk, upper and lower limbs, and dorsum of feet  We discussed the theoretical premalignant (pre-cancerous) nature and etiology of these growths  We discussed the prevailing notion that actinic keratoses are a reflection of abnormal skin cell development due to DNA damage by short wavelength UVB  They are more likely to appear if the immune function is poor, due to aging, recent sun exposure, predisposing disease or certain drugs  We discussed that the main concern is that actinic keratoses may predispose to squamous cell carcinoma  It is rare for a solitary actinic keratosis to evolve to squamous cell carcinoma (SCC), but the risk of SCC occurring at some stage in a patient with more than 10 actinic keratoses is thought to be about 10 to 15%  A tender, thickened, ulcerated or enlarging actinic keratosis is suspicious of SCC  Actinic keratoses may be prevented by strict sun protection  If already present, keratoses may improve with a very high sun protection factor (50+) broad-spectrum sunscreen applied at least daily to affected areas, year-round  We recommend that UPF-rated clothing and hats and sunglasses be worn whenever possible and that a sunscreen-moisturizer combination product such as Neutrogena Daily Defense be applied at least three times a day      We performed a thorough discussion of treatment options and specific risk/benefits/alternatives including but not limited to medical field treatment with medications such as the following:     Topical field area medications such as 5-fluorouracil or Aldara (specifically, the trouble with long-term compliance, blistering and local skin reaction versus the convenience of at-home therapy and that field therapy gets what is not yet seen)   Cryotherapy (specifically, local pain, scarring, dyspigmentation, blistering, possible superinfection, and treats only what we see versus directed treatment today)   Photodynamic therapy (specifically, local pain, scarring, dyspigmentation, blistering, possible superinfection, need to schedule for a later date, and time spent in the office versus field therapy that gets what is not yet seen)  PROCEDURE:  DESTRUCTION OF PRE-MALIGNANT LESIONS  After a thorough discussion of treatment options and risk/benefits/alternatives (including but not limited to local pain, scarring, dyspigmentation, blistering, and possible superinfection), verbal and written consent were obtained and the aforementioned lesions were treated on with cryotherapy using liquid nitrogen x 1 cycle for 5-10 seconds   TOTAL NUMBER of 3 pre-malignant lesions were treated today on the ANATOMIC LOCATION: cheeks  The patient tolerated the procedure well, and after-care instructions were provided  PRURIGO NODULARIS    Physical Exam:   Anatomic Location Affected:  Bilateral forearm   Morphological Description:  Pink keratotic papules   Pertinent Positives:   Pertinent Negatives:     Additional History of Present Condition:  Picks at them    Assessment and Plan:  Based on a thorough discussion of this condition and the management approach to it (including a comprehensive discussion of the known risks, side effects and potential benefits of treatment), the patient (family) agrees to implement the following specific plan:  Clobetasol 0 05% cream apply topically to the forearms 2 times daily for 4 weeks  Follow up before next skin exam if not resolved in 4 weeks with medication    What is nodular prurigo? Nodular prurigo is a skin condition characterised by very itchy firm lumps  It is the most severe form of prurigo  It is not known why these lumps appear  It is also called prurigo nodularis  Nodular prurigo is very difficult to treat effectively  Who gets nodular prurigo? Nodular prurigo can occur at all ages but mainly in adults aged 19-56 years  Both sexes are equally affected  Up to 80% of patients have a personal or family history of atopic dermatitis, asthma or hay fever (compared to about 25% of the normal population)  It has been associated with internal disease including iron deficiency anaemia, chronic renal failure, gluten enteropathy, HIV infection and many other diverse conditions  What is the cause of nodular prurigo? The cause of nodular prurigo is unknown  It is uncertain whether scratching leads to the nodules or if the nodules appear before they are scratched  The reason for the nodules, the inflammation and the increased activity and size of nerves in the skin is under investigation but remains unknown  Nodular prurigo may commence as an insect bite reaction or another form of dermatitis  In some cases, nodular prurigo has been associated with brachioradial pruritus, which is due to compression or traction of spinal nerves  This theory may explain why local treatment is not always successful  It has been speculated that widespread nodular prurigo may also follow sensitisation of the spinal nerves and that the nodules appear because of scratching  What are the clinical features of nodular prurigo? The individual prurigo nodule is a firm lump, 1-3 cm in diameter, often with a raised warty surface  The early lesion may start as a smaller red itchy bump   Crusting and scaling may cover recently scratched lesions  Older lesions may be darker or paler than surrounding skin  The skin in between the nodules is often dry  The itch is often very intense, often for hours on end, leading to vigorous scratching and sometimes secondary infection  Nodular prurigo lesions are usually grouped and numerous but may vary in number from 2-200  Nodular prurigo tends to be symmetrically distributed  They usually start on the lower arms and legs and are worse on the outer aspects  The trunk, face and even palms can also be affected  Sometimes the prurigo nodules are most obvious on the cape area (neck, shoulders and upper arms)  New nodules appear from time to time, but existing nodules may regress spontaneously to leave scars  Nodular prurigo often runs a long course and can lead to significant stress and depression  How is nodular prurigo diagnosed? In most cases, the diagnosis is made clinically due to the degree of itch and the typical appearance of the nodules  A skin biopsy may be useful to confirm the diagnosis of nodular prurigo  Under the microscope, the skin is enormously thickened and may appear quite abnormal, sometimes resembling squamous cell skin cancer  The nerve fibres and nerve endings in the skin are markedly increased in size  The skin is inflamed and there is an increased number of neural mediators known to cause itching and nerve growth  Direct immunofluorescence looking for antibody deposition in the skin is usually negative  Rarely, the blistering disease bullous pemphigoid can present as nodular prurigo (pemphigoid nodularis)  In this case, immunofluorescence reveals immunoglobulins in the basement membrane zone below the epidermis  The prurigo nodules can be present for weeks or months before any blisters appear      It is important to identify underlying diseases that are associated with nodular prurigo; blood tests may include full blood count, liver, kidney and thyroid function tests  Patch testing may be worthwhile if contact allergy is considered possible  What is the treatment for nodular prurigo? Unfortunately, nodular prurigo is one of the more resistant conditions skin specialists are called upon to treat  Local treatments that may be tried include:   Emollients applied liberally and frequently to cool and soothe itchy skin - menthol or phenol may be added   Oral antihistamines at night to reduce itch and allow sleep   Ultrapotent topical steroid creams  To enhance their effect, apply under occlusion; cover with a plastic or hydrocolloid adhesive dressing and leave this in place for several days   Corticosteroid injections (triamcinolone acetonide 10-40 mg/mL) into thicker nodules   Coal tar ointment as a steroid alternative   Calcipotriol ointment (topical vitamin D3), usually used for psoriasis, can be applied twice daily   Capsaicin cream, which induces itching and burning until eventually, the itch stops completely -- it requires repeated applications four to six times daily   Cryotherapy, which may shrink the nodules and reduce their itch   Treatments with pulsed dye laser, which may reduce the vascularity of individual lesions     Antiseptic or antibiotic ointment may be used on infected nodules, and oral antibiotics (usually flucloxacillin) are indicated for significant secondary bacterial infection (cellulitis)  Systemic treatments that may be helpful for more severe disease are listed below, in no particular order  Combination treatment is frequently recommended   Phototherapy (UVB and PUVA)    Tricyclic antidepressants such as amitriptyline or doxepin    Anticonvulsants used for neuropathic pain and itch, such as gabapentin or pregabalin    Naltrexone, an opiate antagonist (this counteracts the narcotic effect of morphine, heroin and similar drugs), has been reported to reduce itching in some subjects      Oral steroids  Methotrexate    Thalidomide, which is reserved for very severe cases and may be difficult to access   Ciclosporin, which may reduce the lumps and the itching but its use is limited by side effects   Systemic retinoids such as acitretin or isotretinoin, which may shrink the nodules and reduce the severity of the itch          Scribe Attestation    I,:  Mitali Salmeron am acting as a scribe while in the presence of the attending physician :       I,:  Osmani Azar MD personally performed the services described in this documentation    as scribed in my presence :

## 2022-07-20 ENCOUNTER — TELEPHONE (OUTPATIENT)
Dept: DERMATOLOGY | Facility: CLINIC | Age: 57
End: 2022-07-20

## 2022-07-20 DIAGNOSIS — L28.1 PRURIGO NODULARIS: Primary | ICD-10-CM

## 2022-07-20 RX ORDER — CLOBETASOL PROPIONATE 0.5 MG/G
OINTMENT TOPICAL 2 TIMES DAILY
Qty: 30 G | Refills: 0 | Status: SHIPPED | OUTPATIENT
Start: 2022-07-20

## 2022-07-20 NOTE — TELEPHONE ENCOUNTER
He didn't give us a pharmacy  I sent it to the [facility] in [place], based on his home address

## 2022-07-20 NOTE — TELEPHONE ENCOUNTER
Pt left a v/m about a cream that was supposed to be sent in, c/b but n/a, looking into pt chart it looks like the cream is Clobetasol 0 05%, pt did say in v/m he might stop into the office today  The number to reach him on is [phone]   Thank you

## 2022-08-31 ENCOUNTER — APPOINTMENT (OUTPATIENT)
Dept: RADIOLOGY | Facility: HOSPITAL | Age: 57
End: 2022-08-31
Payer: COMMERCIAL

## 2022-08-31 ENCOUNTER — HOSPITAL ENCOUNTER (EMERGENCY)
Facility: HOSPITAL | Age: 57
Discharge: HOME | End: 2022-08-31
Attending: EMERGENCY MEDICINE
Payer: COMMERCIAL

## 2022-08-31 VITALS
DIASTOLIC BLOOD PRESSURE: 79 MMHG | HEART RATE: 78 BPM | OXYGEN SATURATION: 98 % | RESPIRATION RATE: 16 BRPM | TEMPERATURE: 97 F | SYSTOLIC BLOOD PRESSURE: 133 MMHG

## 2022-08-31 DIAGNOSIS — R10.30 INGUINAL PAIN: Primary | ICD-10-CM

## 2022-08-31 DIAGNOSIS — R59.1 LYMPHADENOPATHY: ICD-10-CM

## 2022-08-31 LAB
ALBUMIN SERPL BCP-MCNC: 4.1 G/DL (ref 3.5–5)
ALP SERPL-CCNC: 60 U/L (ref 46–116)
ALT SERPL W P-5'-P-CCNC: 30 U/L (ref 12–78)
AMORPH URATE CRY URNS QL MICRO: ABNORMAL /HPF
ANION GAP SERPL CALCULATED.3IONS-SCNC: 10 MMOL/L (ref 4–13)
AST SERPL W P-5'-P-CCNC: 12 U/L (ref 5–45)
BACTERIA UR QL AUTO: ABNORMAL /HPF
BASOPHILS # BLD AUTO: 0.06 THOUSANDS/ΜL (ref 0–0.1)
BASOPHILS NFR BLD AUTO: 1 % (ref 0–1)
BILIRUB SERPL-MCNC: 0.39 MG/DL (ref 0.2–1)
BILIRUB UR QL STRIP: NEGATIVE
BUN SERPL-MCNC: 15 MG/DL (ref 5–25)
CALCIUM SERPL-MCNC: 9.5 MG/DL (ref 8.3–10.1)
CHLORIDE SERPL-SCNC: 102 MMOL/L (ref 96–108)
CLARITY UR: CLEAR
CO2 SERPL-SCNC: 27 MMOL/L (ref 21–32)
COLOR UR: YELLOW
CREAT SERPL-MCNC: 1.02 MG/DL (ref 0.6–1.3)
EOSINOPHIL # BLD AUTO: 0.06 THOUSAND/ΜL (ref 0–0.61)
EOSINOPHIL NFR BLD AUTO: 1 % (ref 0–6)
ERYTHROCYTE [DISTWIDTH] IN BLOOD BY AUTOMATED COUNT: 12.9 % (ref 11.6–15.1)
GFR SERPL CREATININE-BSD FRML MDRD: 80 ML/MIN/1.73SQ M
GLUCOSE SERPL-MCNC: 174 MG/DL (ref 65–140)
GLUCOSE UR STRIP-MCNC: ABNORMAL MG/DL
HCT VFR BLD AUTO: 48.3 % (ref 36.5–49.3)
HGB BLD-MCNC: 15.4 G/DL (ref 12–17)
HGB UR QL STRIP.AUTO: NEGATIVE
IMM GRANULOCYTES # BLD AUTO: 0.11 THOUSAND/UL (ref 0–0.2)
IMM GRANULOCYTES NFR BLD AUTO: 1 % (ref 0–2)
KETONES UR STRIP-MCNC: NEGATIVE MG/DL
LEUKOCYTE ESTERASE UR QL STRIP: ABNORMAL
LYMPHOCYTES # BLD AUTO: 2.98 THOUSANDS/ΜL (ref 0.6–4.47)
LYMPHOCYTES NFR BLD AUTO: 28 % (ref 14–44)
MCH RBC QN AUTO: 27.8 PG (ref 26.8–34.3)
MCHC RBC AUTO-ENTMCNC: 31.9 G/DL (ref 31.4–37.4)
MCV RBC AUTO: 87 FL (ref 82–98)
MONOCYTES # BLD AUTO: 0.93 THOUSAND/ΜL (ref 0.17–1.22)
MONOCYTES NFR BLD AUTO: 9 % (ref 4–12)
MUCOUS THREADS UR QL AUTO: ABNORMAL
NEUTROPHILS # BLD AUTO: 6.64 THOUSANDS/ΜL (ref 1.85–7.62)
NEUTS SEG NFR BLD AUTO: 60 % (ref 43–75)
NITRITE UR QL STRIP: NEGATIVE
NON-SQ EPI CELLS URNS QL MICRO: ABNORMAL /HPF
NRBC BLD AUTO-RTO: 0 /100 WBCS
PH UR STRIP.AUTO: 5.5 [PH]
PLATELET # BLD AUTO: 327 THOUSANDS/UL (ref 149–390)
PMV BLD AUTO: 8.2 FL (ref 8.9–12.7)
POTASSIUM SERPL-SCNC: 4.2 MMOL/L (ref 3.5–5.3)
PROT SERPL-MCNC: 7.8 G/DL (ref 6.4–8.4)
PROT UR STRIP-MCNC: NEGATIVE MG/DL
RBC # BLD AUTO: 5.53 MILLION/UL (ref 3.88–5.62)
RBC #/AREA URNS AUTO: ABNORMAL /HPF
SODIUM SERPL-SCNC: 139 MMOL/L (ref 135–147)
SP GR UR STRIP.AUTO: >=1.03 (ref 1–1.03)
UROBILINOGEN UR QL STRIP.AUTO: 0.2 E.U./DL
WBC # BLD AUTO: 10.78 THOUSAND/UL (ref 4.31–10.16)
WBC #/AREA URNS AUTO: ABNORMAL /HPF

## 2022-08-31 PROCEDURE — 85025 COMPLETE CBC W/AUTO DIFF WBC: CPT | Performed by: EMERGENCY MEDICINE

## 2022-08-31 PROCEDURE — 81001 URINALYSIS AUTO W/SCOPE: CPT | Performed by: EMERGENCY MEDICINE

## 2022-08-31 PROCEDURE — 96360 HYDRATION IV INFUSION INIT: CPT

## 2022-08-31 PROCEDURE — 99284 EMERGENCY DEPT VISIT MOD MDM: CPT

## 2022-08-31 PROCEDURE — 36415 COLL VENOUS BLD VENIPUNCTURE: CPT | Performed by: EMERGENCY MEDICINE

## 2022-08-31 PROCEDURE — 99282 EMERGENCY DEPT VISIT SF MDM: CPT | Performed by: EMERGENCY MEDICINE

## 2022-08-31 PROCEDURE — 80053 COMPREHEN METABOLIC PANEL: CPT | Performed by: EMERGENCY MEDICINE

## 2022-08-31 PROCEDURE — 76705 ECHO EXAM OF ABDOMEN: CPT

## 2022-08-31 PROCEDURE — 96361 HYDRATE IV INFUSION ADD-ON: CPT

## 2022-08-31 RX ORDER — OXYCODONE HYDROCHLORIDE AND ACETAMINOPHEN 5; 325 MG/1; MG/1
1 TABLET ORAL EVERY 4 HOURS PRN
COMMUNITY

## 2022-08-31 RX ADMIN — SODIUM CHLORIDE 1000 ML: 0.9 INJECTION, SOLUTION INTRAVENOUS at 13:00

## 2022-08-31 NOTE — ED PROVIDER NOTES
History  Chief Complaint   Patient presents with    Evaluation of Abnormal Diagnostic Test     Had bloodwork done yesterday, states has elevated wbc   Hernia     States injured himself 3 days ago and  got inguinal hernia,states no trouble urinating or moving bowels     Patient states she felt a painful lump in his left groin starting 3 days ago during strenuous sexual activity  He has had no constitutional GI symptoms, no nausea vomiting or bowel changes  Denies any dysuria  Patient states he had the chills but no fever  Patient was seen by his PCP and notified that he had elevated WBC count yesterday  Prior to Admission Medications   Prescriptions Last Dose Informant Patient Reported? Taking?    ATORVASTATIN CALCIUM PO   Yes Yes   Sig: Take by mouth in the morning   DULoxetine (CYMBALTA) 30 mg delayed release capsule   Yes No   Sig: Take 90 mg by mouth daily   FENOFIBRATE PO   Yes No   Sig: Take 145 mg by mouth in the morning   Glucos-Giuseppe-MSM-Lw-C-WqJy-SeCu (DURAFLEX PO)   Yes No   Sig: Take 10 mg by mouth daily   METFORMIN HCL ER, MOD, PO   Yes No   Sig: Take 500 mg by mouth 2 (two) times a day   TIZANIDINE HCL PO   Yes Yes   Sig: Take by mouth daily at bedtime   clobetasol (TEMOVATE) 0 05 % ointment   No No   Sig: Apply topically 2 (two) times a day To bumps on forearms for 4 weeks   cyclobenzaprine (FLEXERIL) 10 mg tablet   Yes No   Sig: Take 10 mg by mouth 3 (three) times a day as needed for muscle spasms   naproxen (NAPROSYN) 500 mg tablet   No No   Sig: Take 1 tablet by mouth 2 (two) times a day with meals for 10 days   oxyCODONE-acetaminophen (PERCOCET) 5-325 mg per tablet   Yes Yes   Sig: Take 1 tablet by mouth every 4 (four) hours as needed for moderate pain      Facility-Administered Medications: None       Past Medical History:   Diagnosis Date    Diabetes mellitus ([address] )     Migraine        Past Surgical History:   Procedure Laterality Date    HIP SURGERY      KNEE SURGERY      SHOULDER SURGERY         History reviewed  No pertinent family history  I have reviewed and agree with the history as documented  E-Cigarette/Vaping    E-Cigarette Use Never User      E-Cigarette/Vaping Substances     Social History     Tobacco Use    Smoking status: Current Every Day Smoker     Types: Cigars    Smokeless tobacco: Never Used   Vaping Use    Vaping Use: Never used   Substance Use Topics    Alcohol use: No    Drug use: No       Review of Systems   Constitutional: Positive for chills  Negative for appetite change and fever  HENT: Negative for congestion and sore throat  Eyes: Negative for visual disturbance  Respiratory: Negative for cough and shortness of breath  Cardiovascular: Negative for chest pain  Gastrointestinal: Positive for abdominal pain  Negative for blood in stool, diarrhea, nausea and vomiting  Genitourinary: Negative for difficulty urinating and dysuria  Musculoskeletal: Negative for back pain  Skin: Negative for rash  Neurological: Negative for headaches  Hematological: Does not bruise/bleed easily  Psychiatric/Behavioral: Negative for confusion  All other systems reviewed and are negative  Physical Exam  Physical Exam  Vitals and nursing note reviewed  Constitutional:       Appearance: Normal appearance  HENT:      Head: Normocephalic  Right Ear: External ear normal       Left Ear: External ear normal       Nose: Nose normal       Mouth/Throat:      Pharynx: Oropharynx is clear  Eyes:      Conjunctiva/sclera: Conjunctivae normal    Cardiovascular:      Rate and Rhythm: Normal rate and regular rhythm  Pulses: Normal pulses  Pulmonary:      Effort: Pulmonary effort is normal    Abdominal:      Palpations: Abdomen is soft  Tenderness: There is no abdominal tenderness  Genitourinary:     Comments: There is marked tenderness in the left inguinal canal with a firm tender lymph node laterally    No hernias palpated  Musculoskeletal:         General: Normal range of motion  Cervical back: Normal range of motion  Skin:     General: Skin is warm and dry  Capillary Refill: Capillary refill takes less than 2 seconds  Neurological:      General: No focal deficit present  Mental Status: He is alert and oriented to person, place, and time  Psychiatric:         Mood and Affect: Mood normal          Behavior: Behavior normal          Vital Signs  ED Triage Vitals [08/31/22 1154]   Temperature Pulse Respirations Blood Pressure SpO2   (!) 97 °F (36 1 °C) 78 16 133/79 98 %      Temp Source Heart Rate Source Patient Position - Orthostatic VS BP Location FiO2 (%)   Tympanic Monitor Sitting Right arm --      Pain Score       No Pain           Vitals:    08/31/22 1154   BP: 133/79   Pulse: 78   Patient Position - Orthostatic VS: Sitting         Visual Acuity      ED Medications  Medications   sodium chloride 0 9 % bolus 1,000 mL (1,000 mL Intravenous New Bag 8/31/22 1300)       Diagnostic Studies  Results Reviewed     Procedure Component Value Units Date/Time    Urine Microscopic [[id number]]  (Abnormal) Collected: 08/31/22 1424    Lab Status: Final result Specimen: Urine, Clean Catch Updated: 08/31/22 1443     RBC, UA 0-1 /hpf      WBC, UA 0-1 /hpf      Epithelial Cells None Seen /hpf      Bacteria, UA Occasional /hpf      AMORPH URATES Occasional /hpf      MUCUS THREADS Occasional    UA (URINE) with reflex to Scope [[id number]]  (Abnormal) Collected: 08/31/22 1424    Lab Status: Final result Specimen: Urine, Clean Catch Updated: 08/31/22 1433     Color, UA Yellow     Clarity, UA Clear     Specific Gravity, UA >=1 030     pH, UA 5 5     Leukocytes, UA Elevated glucose may cause decreased leukocyte values   See urine microscopic for [facility] result/     Nitrite, UA Negative     Protein, UA Negative mg/dl      Glucose, UA >=1000 (1%) mg/dl      Ketones, UA Negative mg/dl      Urobilinogen, UA 0 2 E U /dl      Bilirubin, UA Negative     Occult Blood, UA Negative    Comprehensive metabolic panel [[id number]]  (Abnormal) Collected: 08/31/22 1259    Lab Status: Final result Specimen: Blood from Arm, Right Updated: 08/31/22 1325     Sodium 139 mmol/L      Potassium 4 2 mmol/L      Chloride 102 mmol/L      CO2 27 mmol/L      ANION GAP 10 mmol/L      BUN 15 mg/dL      Creatinine 1 02 mg/dL      Glucose 174 mg/dL      Calcium 9 5 mg/dL      AST 12 U/L      ALT 30 U/L      Alkaline Phosphatase 60 U/L      Total Protein 7 8 g/dL      Albumin 4 1 g/dL      Total Bilirubin 0 39 mg/dL      eGFR 80 ml/min/1 73sq m     Narrative:      National Kidney Disease Foundation guidelines for Chronic Kidney Disease (CKD):     Stage 1 with normal or high GFR (GFR > 90 mL/min/1 73 square meters)    Stage 2 Mild CKD (GFR = 60-89 mL/min/1 73 square meters)    Stage 3A Moderate CKD (GFR = 45-59 mL/min/1 73 square meters)    Stage 3B Moderate CKD (GFR = 30-44 mL/min/1 73 square meters)    Stage 4 Severe CKD (GFR = 15-29 mL/min/1 73 square meters)    Stage 5 End Stage CKD (GFR <15 mL/min/1 73 square meters)  Note: GFR calculation is accurate only with a steady state creatinine    CBC and differential [[id number]]  (Abnormal) Collected: 08/31/22 1259    Lab Status: Final result Specimen: Blood from Arm, Right Updated: 08/31/22 1304     WBC 10 78 Thousand/uL      RBC 5 53 Million/uL      Hemoglobin 15 4 g/dL      Hematocrit 48 3 %      MCV 87 fL      MCH 27 8 pg      MCHC 31 9 g/dL      RDW 12 9 %      MPV 8 2 fL      Platelets 492 Thousands/uL      nRBC 0 /100 WBCs      Neutrophils Relative 60 %      Immat GRANS % 1 %      Lymphocytes Relative 28 %      Monocytes Relative 9 %      Eosinophils Relative 1 %      Basophils Relative 1 %      Neutrophils Absolute 6 64 Thousands/µL      Immature Grans Absolute 0 11 Thousand/uL      Lymphocytes Absolute 2 98 Thousands/µL      Monocytes Absolute 0 93 Thousand/µL      Eosinophils Absolute 0 06 Thousand/µL Basophils Absolute 0 06 Thousands/µL                  US groin/inguinal area   Final Result by Star Ford MD (08/31 1411)      Heterogeneously hyperechoic subcutaneous mass in the left groin without significant internal vascularity  This could reflect a lipoma but is not definitive  There are enlarged lymph nodes seen in the left groin as above  These are indeterminate  Further evaluation with CT may be more helpful  Workstation performed: [id number]                    Procedures  Procedures         ED Course                               SBIRT 20yo+    Flowsheet Row Most Recent Value   SBIRT (23 yo +)    In order to provide better care to our patients, we are screening all of our patients for alcohol and drug use  Would it be okay to ask you these screening questions? Yes Filed at: 08/31/2022 1236   Initial Alcohol Screen: US AUDIT-C     1  How often do you have a drink containing alcohol? 0 Filed at: 08/31/2022 1236   2  How many drinks containing alcohol do you have on a typical day you are drinking? 0 Filed at: 08/31/2022 1236   3a  Male UNDER 65: How often do you have five or more drinks on one occasion? 0 Filed at: 08/31/2022 1236   3b  FEMALE Any Age, or MALE 65+: How often do you have 4 or more drinks on one occassion? 0 Filed at: 08/31/2022 1236   Audit-C Score 0 Filed at: 08/31/2022 1236   DEVON: How many times in the past year have you    Used an illegal drug or used a prescription medication for non-medical reasons? Never Filed at: 08/31/2022 1236                    MDM  Number of Diagnoses or Management Options  Diagnosis management comments:  Will recheck blood test, check urine and ultrasound of the groin      Disposition  Final diagnoses:   Inguinal pain   Lymphadenopathy     Time reflects when diagnosis was documented in both MDM as applicable and the Disposition within this note     Time User Action Codes Description Comment    8/31/2022  3:07 PM Carly Junior Add [R10 30] Inguinal pain     8/31/2022  3:07 PM Jack Pennington Add [R59 1] Lymphadenopathy       ED Disposition     ED Disposition   Discharge    Condition   Stable    Date/Time   Wed Aug 31, 2022  3:06 PM    Comment   Paola Pugh discharge to home/self care  Follow-up Information     Follow up With Specialties Details Why Contact Info    Kamini Moncada DO Family Medicine Schedule an appointment as soon as possible for a visit   [address] [place] [address]  [phone]            Patient's Medications   Discharge Prescriptions    No medications on file       No discharge procedures on file      PDMP Review     None          ED Provider  Electronically Signed by           Milan Aranda MD  08/31/22 7151

## 2022-09-24 ENCOUNTER — APPOINTMENT (OUTPATIENT)
Dept: RADIOLOGY | Facility: HOSPITAL | Age: 57
End: 2022-09-24
Payer: COMMERCIAL

## 2022-09-24 ENCOUNTER — HOSPITAL ENCOUNTER (OUTPATIENT)
Dept: RADIOLOGY | Facility: HOSPITAL | Age: 57
Discharge: HOME | End: 2022-09-24
Payer: COMMERCIAL

## 2022-09-24 DIAGNOSIS — M25.512 PAIN IN SHOULDER REGION, LEFT: ICD-10-CM

## 2022-09-24 DIAGNOSIS — Z98.890 STATUS POST SHOULDER SURGERY: ICD-10-CM

## 2022-09-24 PROCEDURE — G1004 CDSM NDSC: HCPCS

## 2022-09-24 PROCEDURE — 73221 MRI JOINT UPR EXTREM W/O DYE: CPT

## 2022-11-02 ENCOUNTER — APPOINTMENT (OUTPATIENT)
Dept: RADIOLOGY | Facility: CLINIC | Age: 57
End: 2022-11-02

## 2022-11-02 ENCOUNTER — LAB (OUTPATIENT)
Dept: LAB | Facility: CLINIC | Age: 57
End: 2022-11-02

## 2022-11-02 DIAGNOSIS — Z01.812 PRE-OPERATIVE LABORATORY EXAMINATION: ICD-10-CM

## 2022-11-02 DIAGNOSIS — Z01.818 OTHER SPECIFIED PRE-OPERATIVE EXAMINATION: ICD-10-CM

## 2022-11-02 LAB
ANION GAP SERPL CALCULATED.3IONS-SCNC: 6 MMOL/L (ref 4–13)
APTT PPP: 29 SECONDS (ref 23–37)
ATRIAL RATE: 72 BPM
BUN SERPL-MCNC: 15 MG/DL (ref 5–25)
CALCIUM SERPL-MCNC: 9.5 MG/DL (ref 8.3–10.1)
CHLORIDE SERPL-SCNC: 104 MMOL/L (ref 96–108)
CO2 SERPL-SCNC: 25 MMOL/L (ref 21–32)
CREAT SERPL-MCNC: 1.09 MG/DL (ref 0.6–1.3)
ERYTHROCYTE [DISTWIDTH] IN BLOOD BY AUTOMATED COUNT: 12.6 % (ref 11.6–15.1)
GFR SERPL CREATININE-BSD FRML MDRD: 74 ML/MIN/1.73SQ M
GLUCOSE P FAST SERPL-MCNC: 141 MG/DL (ref 65–99)
HCT VFR BLD AUTO: 46.9 % (ref 36.5–49.3)
HGB BLD-MCNC: 14.5 G/DL (ref 12–17)
INR PPP: 0.92 (ref 0.84–1.19)
MCH RBC QN AUTO: 27.4 PG (ref 26.8–34.3)
MCHC RBC AUTO-ENTMCNC: 30.9 G/DL (ref 31.4–37.4)
MCV RBC AUTO: 89 FL (ref 82–98)
P AXIS: 53 DEGREES
PLATELET # BLD AUTO: 399 THOUSANDS/UL (ref 149–390)
PMV BLD AUTO: 8.7 FL (ref 8.9–12.7)
POTASSIUM SERPL-SCNC: 4.1 MMOL/L (ref 3.5–5.3)
PR INTERVAL: 136 MS
PROTHROMBIN TIME: 12.6 SECONDS (ref 11.6–14.5)
QRS AXIS: 42 DEGREES
QRSD INTERVAL: 104 MS
QT INTERVAL: 396 MS
QTC INTERVAL: 433 MS
RBC # BLD AUTO: 5.29 MILLION/UL (ref 3.88–5.62)
SODIUM SERPL-SCNC: 135 MMOL/L (ref 135–147)
T WAVE AXIS: 68 DEGREES
VENTRICULAR RATE: 72 BPM
WBC # BLD AUTO: 9.15 THOUSAND/UL (ref 4.31–10.16)

## 2023-02-22 ENCOUNTER — OFFICE VISIT (OUTPATIENT)
Dept: URGENT CARE | Facility: CLINIC | Age: 58
End: 2023-02-22

## 2023-02-22 ENCOUNTER — TELEPHONE (OUTPATIENT)
Dept: OBGYN CLINIC | Facility: HOSPITAL | Age: 58
End: 2023-02-22

## 2023-02-22 ENCOUNTER — HOSPITAL ENCOUNTER (EMERGENCY)
Facility: HOSPITAL | Age: 58
Discharge: HOME | End: 2023-02-22
Attending: EMERGENCY MEDICINE | Admitting: EMERGENCY MEDICINE

## 2023-02-22 ENCOUNTER — APPOINTMENT (EMERGENCY)
Dept: RADIOLOGY | Facility: HOSPITAL | Age: 58
End: 2023-02-22

## 2023-02-22 VITALS
WEIGHT: 210 LBS | BODY MASS INDEX: 30.57 KG/M2 | HEART RATE: 83 BPM | SYSTOLIC BLOOD PRESSURE: 151 MMHG | TEMPERATURE: 97.7 F | OXYGEN SATURATION: 96 % | DIASTOLIC BLOOD PRESSURE: 85 MMHG | RESPIRATION RATE: 18 BRPM

## 2023-02-22 VITALS
OXYGEN SATURATION: 97 % | TEMPERATURE: 97.7 F | DIASTOLIC BLOOD PRESSURE: 85 MMHG | SYSTOLIC BLOOD PRESSURE: 140 MMHG | HEIGHT: 70 IN | HEART RATE: 65 BPM | RESPIRATION RATE: 18 BRPM | WEIGHT: 210 LBS | BODY MASS INDEX: 30.06 KG/M2

## 2023-02-22 DIAGNOSIS — S61.219A INFECTED FINGER LACERATION INVOLVING TENDON, INITIAL ENCOUNTER: Primary | ICD-10-CM

## 2023-02-22 DIAGNOSIS — L08.9 INFECTED FINGER LACERATION INVOLVING TENDON, INITIAL ENCOUNTER: Primary | ICD-10-CM

## 2023-02-22 DIAGNOSIS — L08.9 INFECTION OF FINGER: Primary | ICD-10-CM

## 2023-02-22 LAB
ALBUMIN SERPL BCP-MCNC: 4.7 G/DL (ref 3.5–5)
ALP SERPL-CCNC: 47 U/L (ref 34–104)
ALT SERPL W P-5'-P-CCNC: 15 U/L (ref 7–52)
ANION GAP SERPL CALCULATED.3IONS-SCNC: 8 MMOL/L (ref 4–13)
APTT PPP: 30 SECONDS (ref 23–37)
AST SERPL W P-5'-P-CCNC: 12 U/L (ref 13–39)
BASOPHILS # BLD AUTO: 0.04 THOUSANDS/ÂΜL (ref 0–0.1)
BASOPHILS NFR BLD AUTO: 0 % (ref 0–1)
BILIRUB SERPL-MCNC: 0.63 MG/DL (ref 0.2–1)
BUN SERPL-MCNC: 11 MG/DL (ref 5–25)
CALCIUM SERPL-MCNC: 9.6 MG/DL (ref 8.4–10.2)
CHLORIDE SERPL-SCNC: 101 MMOL/L (ref 96–108)
CO2 SERPL-SCNC: 25 MMOL/L (ref 21–32)
CREAT SERPL-MCNC: 0.88 MG/DL (ref 0.6–1.3)
EOSINOPHIL # BLD AUTO: 0.04 THOUSAND/ÂΜL (ref 0–0.61)
EOSINOPHIL NFR BLD AUTO: 0 % (ref 0–6)
ERYTHROCYTE [DISTWIDTH] IN BLOOD BY AUTOMATED COUNT: 13.1 % (ref 11.6–15.1)
FLUAV RNA RESP QL NAA+PROBE: NEGATIVE
FLUBV RNA RESP QL NAA+PROBE: NEGATIVE
GFR SERPL CREATININE-BSD FRML MDRD: 94 ML/MIN/1.73SQ M
GLUCOSE SERPL-MCNC: 104 MG/DL (ref 65–140)
HCT VFR BLD AUTO: 45.5 % (ref 36.5–49.3)
HGB BLD-MCNC: 14.8 G/DL (ref 12–17)
IMM GRANULOCYTES # BLD AUTO: 0.07 THOUSAND/UL (ref 0–0.2)
IMM GRANULOCYTES NFR BLD AUTO: 1 % (ref 0–2)
INR PPP: 1.05 (ref 0.84–1.19)
LACTATE SERPL-SCNC: 0.9 MMOL/L (ref 0.5–2)
LYMPHOCYTES # BLD AUTO: 2.79 THOUSANDS/ÂΜL (ref 0.6–4.47)
LYMPHOCYTES NFR BLD AUTO: 23 % (ref 14–44)
MAGNESIUM SERPL-MCNC: 1.8 MG/DL (ref 1.9–2.7)
MCH RBC QN AUTO: 28 PG (ref 26.8–34.3)
MCHC RBC AUTO-ENTMCNC: 32.5 G/DL (ref 31.4–37.4)
MCV RBC AUTO: 86 FL (ref 82–98)
MONOCYTES # BLD AUTO: 0.91 THOUSAND/ÂΜL (ref 0.17–1.22)
MONOCYTES NFR BLD AUTO: 8 % (ref 4–12)
NEUTROPHILS # BLD AUTO: 8.09 THOUSANDS/ÂΜL (ref 1.85–7.62)
NEUTS SEG NFR BLD AUTO: 68 % (ref 43–75)
NRBC BLD AUTO-RTO: 0 /100 WBCS
PLATELET # BLD AUTO: 365 THOUSANDS/UL (ref 149–390)
PMV BLD AUTO: 8.4 FL (ref 8.9–12.7)
POTASSIUM SERPL-SCNC: 4 MMOL/L (ref 3.5–5.3)
PROCALCITONIN SERPL-MCNC: 0.09 NG/ML
PROT SERPL-MCNC: 7.9 G/DL (ref 6.4–8.4)
PROTHROMBIN TIME: 13.9 SECONDS (ref 11.6–14.5)
RBC # BLD AUTO: 5.28 MILLION/UL (ref 3.88–5.62)
RSV RNA RESP QL NAA+PROBE: NEGATIVE
SARS-COV-2 RNA RESP QL NAA+PROBE: NEGATIVE
SODIUM SERPL-SCNC: 134 MMOL/L (ref 135–147)
WBC # BLD AUTO: 11.94 THOUSAND/UL (ref 4.31–10.16)

## 2023-02-22 RX ORDER — KETOROLAC TROMETHAMINE 30 MG/ML
15 INJECTION, SOLUTION INTRAMUSCULAR; INTRAVENOUS ONCE
Status: COMPLETED | OUTPATIENT
Start: 2023-02-22 | End: 2023-02-22

## 2023-02-22 RX ORDER — SULFAMETHOXAZOLE AND TRIMETHOPRIM 800; 160 MG/1; MG/1
1 TABLET ORAL 2 TIMES DAILY
Qty: 14 TABLET | Refills: 0 | Status: SHIPPED | OUTPATIENT
Start: 2023-02-22 | End: 2023-03-01

## 2023-02-22 RX ORDER — CLINDAMYCIN PHOSPHATE 600 MG/50ML
600 INJECTION INTRAVENOUS ONCE
Status: COMPLETED | OUTPATIENT
Start: 2023-02-22 | End: 2023-02-22

## 2023-02-22 RX ORDER — CLINDAMYCIN HYDROCHLORIDE 300 MG/1
300 CAPSULE ORAL 3 TIMES DAILY
Qty: 21 CAPSULE | Refills: 0 | Status: SHIPPED | OUTPATIENT
Start: 2023-02-22 | End: 2023-02-22

## 2023-02-22 RX ORDER — NIRMATRELVIR AND RITONAVIR 300-100 MG
KIT ORAL
COMMUNITY
Start: 2022-12-09

## 2023-02-22 RX ORDER — OXYCODONE HYDROCHLORIDE 5 MG/1
5 TABLET ORAL EVERY 4 HOURS PRN
Qty: 10 TABLET | Refills: 0 | Status: SHIPPED | OUTPATIENT
Start: 2023-02-22 | End: 2023-02-25

## 2023-02-22 RX ORDER — MELATONIN 10 MG
10 CAPSULE ORAL
COMMUNITY

## 2023-02-22 RX ORDER — IBUPROFEN 600 MG/1
600 TABLET ORAL EVERY 6 HOURS PRN
Qty: 28 TABLET | Refills: 0 | Status: SHIPPED | OUTPATIENT
Start: 2023-02-22 | End: 2023-03-01

## 2023-02-22 RX ADMIN — IOHEXOL 100 ML: 350 INJECTION, SOLUTION INTRAVENOUS at 14:42

## 2023-02-22 RX ADMIN — CLINDAMYCIN PHOSPHATE 600 MG: 600 INJECTION, SOLUTION INTRAVENOUS at 14:13

## 2023-02-22 RX ADMIN — KETOROLAC TROMETHAMINE 15 MG: 30 INJECTION, SOLUTION INTRAMUSCULAR at 15:43

## 2023-02-22 NOTE — PROGRESS NOTES
[facility]        NAME: Vanessa Rodriguez is a 61 y o  male  : 1965    MRN: [id number]  DATE: 2023  TIME: 11:39 AM    Assessment and Plan   Infected finger laceration involving tendon, initial encounter [S61 219A, L08 9]  1  Infected finger laceration involving tendon, initial encounter  Tdap Vaccine greater than or equal to 6yo    Transfer to other facility        Purulent left middle finger laceration concerning for a flexor tenosynovitis versus a DIP septic arthritis  Tdap given  Patient sent to the ED for further evaluation and management  Patient Instructions     Follow up with PCP in 3-5 days  Proceed to  ER if symptoms worsen  Chief Complaint     Chief Complaint   Patient presents with   • Laceration     Left hand , cut with knife , cutting raw chicken open  X yesterday at 1400, tetanus -unknown         History of Present Illness       53-year-old right-hand-dominant male presents today due to a left middle finger laceration sustained yesterday afternoon while trying to cut the package off of meat with a butter knife  The knife slipped and cut his left middle finger at the dorsum of distal phalanx  Washed the wound with Yin soap  Later on applied rubbing alcohol and hydrogen peroxide as he noticed tenderness on the volar pad of the distal phalanx  Today reports pain, swelling and stiffness of the entire finger with paresthesias of the left palm        Review of Systems   Review of Systems      Current Medications       Current Outpatient Medications:   •  ATORVASTATIN CALCIUM PO, Take by mouth in the morning, Disp: , Rfl:   •  DULoxetine (CYMBALTA) 30 mg delayed release capsule, Take 90 mg by mouth daily, Disp: , Rfl:   •  FENOFIBRATE PO, Take 145 mg by mouth in the morning, Disp: , Rfl:   •  Melatonin 10 MG CAPS, Take 10 mg by mouth, Disp: , Rfl:   •  METFORMIN HCL ER, MOD, PO, Take 500 mg by mouth 2 (two) times a day, Disp: , Rfl:   •  TIZANIDINE HCL PO, Take by mouth daily at bedtime, Disp: , Rfl:   •  Apixaban (ELIQUIS PO), Take by mouth (Patient not taking: Reported on 2/22/2023), Disp: , Rfl:   •  clobetasol (TEMOVATE) 0 05 % ointment, Apply topically 2 (two) times a day To bumps on forearms for 4 weeks, Disp: 30 g, Rfl: 0  •  cyclobenzaprine (FLEXERIL) 10 mg tablet, Take 10 mg by mouth 3 (three) times a day as needed for muscle spasms, Disp: , Rfl:   •  Glucos-Giuseppe-MSM-De-D-VwYh-SeCu (DURAFLEX PO), Take 10 mg by mouth daily, Disp: , Rfl:   •  naproxen (NAPROSYN) 500 mg tablet, Take 1 tablet by mouth 2 (two) times a day with meals for 10 days, Disp: 20 tablet, Rfl: 0  •  oxyCODONE-acetaminophen (PERCOCET) 5-325 mg per tablet, Take 1 tablet by mouth every 4 (four) hours as needed for moderate pain (Patient not taking: Reported on 2/22/2023), Disp: , Rfl:   •  Paxlovid, 300/100, tablet therapy pack, , Disp: , Rfl:     Current Allergies     Allergies as of 02/22/2023 - Reviewed 02/22/2023   Allergen Reaction Noted   • Biaxin [clarithromycin]  11/22/2016   • Duricef [cefadroxil]  11/22/2016   • Penicillins Hives, Rash, and Swelling 12/21/2020            The following portions of the patient's history were reviewed and updated as appropriate: allergies, current medications, past family history, past medical history, past social history, past surgical history and problem list      Past Medical History:   Diagnosis Date   • Diabetes mellitus ([facility] [address] )    • Migraine        Past Surgical History:   Procedure Laterality Date   • HIP SURGERY     • KNEE SURGERY     • SHOULDER SURGERY         History reviewed  No pertinent family history  Medications have been verified  Objective   /85   Pulse 83   Temp 97 7 °F (36 5 °C)   Resp 18   Wt 95 3 kg (210 lb)   SpO2 96%   BMI 30 57 kg/m²   No LMP for male patient  Physical Exam     Physical Exam  Vitals and nursing note reviewed  Constitutional:       General: He is in acute distress        Appearance: Normal appearance  He is normal weight  He is not ill-appearing, toxic-appearing or diaphoretic  HENT:      Head: Normocephalic and atraumatic  Eyes:      General:         Right eye: No discharge  Left eye: No discharge  Conjunctiva/sclera: Conjunctivae normal    Pulmonary:      Effort: Pulmonary effort is normal    Skin:     General: Skin is warm  Findings: Lesion (1 5 cm linear laceration over the dorsal aspect of the left third digit DIP  Point tenderness involving the entire finger with very limited to no range of motion  Scant purulent drainage noticed from the wound ) present  Neurological:      General: No focal deficit present  Mental Status: He is alert and oriented to person, place, and time  Psychiatric:         Mood and Affect: Mood normal          Behavior: Behavior normal          Thought Content:  Thought content normal          Judgment: Judgment normal

## 2023-02-22 NOTE — TELEPHONE ENCOUNTER
Patient is being referred to a orthopedics  Please schedule accordingly      [facility] [address]   [phone]

## 2023-02-22 NOTE — ED PROVIDER NOTES
History  Chief Complaint   Patient presents with   • Finger Laceration     Cut middle finger left hand on Monday with a knife  Went to urgent care this morning for redness and pain that radiates to hand  Sent here for septic joint work up     Patient is a 39-year-old right-hand-dominant male with past medical history significant for NIDDM 2, migraine, who presents for evaluation of laceration of his left middle finger  Patient was opening cheese with a butter knife when the knife slipped lacerating his finger  He was able to clean the wound at home and did not feel needed further evaluation  On Tuesday he noted some erythema at the margins of the wound, today he noted significant swelling, erythema, tenderness and difficulty moving the digit  This prompted him to seek further evaluation at urgent care  They in turn referred to the ED for further evaluation and treatment for concerns of tenosynovitis  He denies fever, chills, nausea, vomiting  Patient received a tetanus vaccination at urgent care this morning  History provided by:  Patient  Hand Pain  Location:  Left long finger  Quality:  Throbbing, sharp  Severity:  Severe  Onset quality:  Gradual  Duration:  3 days  Timing:  Constant  Progression:  Worsening  Chronicity:  New  Context:  Infected laceration  Relieved by:  Nothing  Worsened by: Movement  Ineffective treatments:  Ibuprofen  Associated symptoms: no abdominal pain, no chest pain, no congestion, no cough, no diarrhea, no ear pain, no fatigue, no fever, no headaches, no loss of consciousness, no myalgias, no nausea, no rash, no rhinorrhea, no shortness of breath, no sore throat, no vomiting and no wheezing    Risk factors:  Diabetes      Prior to Admission Medications   Prescriptions Last Dose Informant Patient Reported? Taking?    ATORVASTATIN CALCIUM PO   Yes No   Sig: Take by mouth in the morning   Apixaban (ELIQUIS PO)   Yes No   Sig: Take by mouth   Patient not taking: Reported on 2/22/2023   DULoxetine (CYMBALTA) 30 mg delayed release capsule   Yes No   Sig: Take 90 mg by mouth daily   FENOFIBRATE PO   Yes No   Sig: Take 145 mg by mouth in the morning   Glucos-Giuseppe-MSM-Xq-O-NzBs-SeCu (DURAFLEX PO)   Yes No   Sig: Take 10 mg by mouth daily   METFORMIN HCL ER, MOD, PO   Yes No   Sig: Take 500 mg by mouth 2 (two) times a day   Melatonin 10 MG CAPS   Yes No   Sig: Take 10 mg by mouth   Paxlovid, 300/100, tablet therapy pack   Yes No   Patient not taking: Reported on 2/22/2023   TIZANIDINE HCL PO   Yes No   Sig: Take by mouth daily at bedtime   clobetasol (TEMOVATE) 0 05 % ointment   No No   Sig: Apply topically 2 (two) times a day To bumps on forearms for 4 weeks   cyclobenzaprine (FLEXERIL) 10 mg tablet   Yes No   Sig: Take 10 mg by mouth 3 (three) times a day as needed for muscle spasms   naproxen (NAPROSYN) 500 mg tablet   No No   Sig: Take 1 tablet by mouth 2 (two) times a day with meals for 10 days   oxyCODONE-acetaminophen (PERCOCET) 5-325 mg per tablet   Yes No   Sig: Take 1 tablet by mouth every 4 (four) hours as needed for moderate pain   Patient not taking: Reported on 2/22/2023      Facility-Administered Medications: None       Past Medical History:   Diagnosis Date   • Diabetes mellitus ([facility] [address] )    • Migraine        Past Surgical History:   Procedure Laterality Date   • HIP SURGERY     • KNEE SURGERY     • SHOULDER SURGERY         History reviewed  No pertinent family history  I have reviewed and agree with the history as documented  E-Cigarette/Vaping   • E-Cigarette Use Never User      E-Cigarette/Vaping Substances     Social History     Tobacco Use   • Smoking status: Every Day     Types: Cigars   • Smokeless tobacco: Never   Vaping Use   • Vaping Use: Never used   Substance Use Topics   • Alcohol use: No   • Drug use: No       Review of Systems   Constitutional: Negative for chills, fatigue and fever  HENT: Negative for congestion, ear pain, rhinorrhea and sore throat  Eyes: Negative for pain and visual disturbance  Respiratory: Negative for cough, shortness of breath and wheezing  Cardiovascular: Negative for chest pain and palpitations  Gastrointestinal: Negative for abdominal pain, diarrhea, nausea and vomiting  Endocrine: Negative  Genitourinary: Negative for dysuria and hematuria  Musculoskeletal: Positive for arthralgias  Negative for back pain and myalgias  Skin: Negative for color change and rash  Allergic/Immunologic: Negative  Neurological: Negative  Negative for seizures, loss of consciousness, syncope and headaches  Hematological: Negative  Psychiatric/Behavioral: Negative  All other systems reviewed and are negative  Physical Exam  Physical Exam  Vitals and nursing note reviewed  Constitutional:       General: He is not in acute distress  Appearance: He is well-developed  HENT:      Head: Normocephalic and atraumatic  Nose: Nose normal       Mouth/Throat:      Mouth: Mucous membranes are moist    Eyes:      General: No scleral icterus  Conjunctiva/sclera: Conjunctivae normal       Pupils: Pupils are equal, round, and reactive to light  Cardiovascular:      Rate and Rhythm: Normal rate and regular rhythm  Pulses: Normal pulses  Heart sounds: Normal heart sounds  No murmur heard  Pulmonary:      Effort: Pulmonary effort is normal  No respiratory distress  Breath sounds: Normal breath sounds  Abdominal:      General: Abdomen is flat  There is no distension  Palpations: Abdomen is soft  Tenderness: There is no abdominal tenderness  Musculoskeletal:         General: Tenderness present  No swelling  Normal range of motion  Cervical back: Normal range of motion and neck supple  Right lower leg: No edema  Left lower leg: No edema  Skin:     General: Skin is warm and dry  Capillary Refill: Capillary refill takes less than 2 seconds     Neurological:      General: No focal deficit present  Mental Status: He is alert and oriented to person, place, and time  Psychiatric:         Mood and Affect: Mood normal                  Vital Signs  ED Triage Vitals   Temperature Pulse Respirations Blood Pressure SpO2   02/22/23 1201 02/22/23 1201 02/22/23 1201 02/22/23 1201 02/22/23 1201   97 7 °F (36 5 °C) 86 18 (!) 154/106 96 %      Temp src Heart Rate Source Patient Position - Orthostatic VS BP Location FiO2 (%)   -- 02/22/23 1428 02/22/23 1428 02/22/23 1428 --    Monitor Lying Right arm       Pain Score       02/22/23 1201       10 - Worst Possible Pain           Vitals:    02/22/23 1201 02/22/23 1428   BP: (!) 154/106 140/85   Pulse: 86 65   Patient Position - Orthostatic VS:  Lying         Visual Acuity      ED Medications  Medications   clindamycin (CLEOCIN) IVPB (premix in dextrose) 600 mg 50 mL (0 mg Intravenous Stopped 2/22/23 1443)   iohexol (OMNIPAQUE) 350 MG/ML injection (SINGLE-DOSE) 100 mL (100 mL Intravenous Given 2/22/23 1442)   ketorolac (TORADOL) injection 15 mg (15 mg Intravenous Given 2/22/23 1543)       Diagnostic Studies  Results Reviewed     Procedure Component Value Units Date/Time    Blood culture #1 [[id number]] Collected: 02/22/23 1403    Lab Status: Preliminary result Specimen: Blood from Arm, Right Updated: 02/22/23 1701     Blood Culture Received in Microbiology Lab  Culture in Progress  Blood culture #2 [[id number]] Collected: 02/22/23 1403    Lab Status: Preliminary result Specimen: Blood from Arm, Left Updated: 02/22/23 1701     Blood Culture Received in Microbiology Lab  Culture in Progress      Procalcitonin [[id number]]  (Normal) Collected: 02/22/23 1403    Lab Status: Final result Specimen: Blood from Arm, Left Updated: 02/22/23 1648     Procalcitonin 0 09 ng/ml     FLU/RSV/COVID - if FLU/RSV clinically relevant [[id number]]  (Normal) Collected: 02/22/23 1403    Lab Status: Final result Specimen: Nares from Nose Updated: 02/22/23 1504     SARS-CoV-2 Negative     INFLUENZA A PCR Negative     INFLUENZA B PCR Negative     RSV PCR Negative    Narrative:      FOR PEDIATRIC PATIENTS - copy/paste COVID Guidelines URL to browser: [email or web address]  [employer]    SARS-CoV-2 assay is a Nucleic Acid Amplification assay intended for the  qualitative detection of nucleic acid from SARS-CoV-2 in nasopharyngeal  swabs  Results are for the presumptive identification of SARS-CoV-2 RNA  Positive results are indicative of infection with SARS-CoV-2, the virus  causing COVID-19, but do not rule out bacterial infection or co-infection  with other viruses  Laboratories within the [place] and its  territories are required to report all positive results to the appropriate  public health authorities  Negative results do not preclude SARS-CoV-2  infection and should not be used as the sole basis for treatment or other  patient management decisions  Negative results must be combined with  clinical observations, patient history, and epidemiological information  This test has not been FDA cleared or approved  This test has been authorized by FDA under an Emergency Use Authorization  (EUA)  This test is only authorized for the duration of time the  declaration that circumstances exist justifying the authorization of the  emergency use of an in vitro diagnostic tests for detection of SARS-CoV-2  virus and/or diagnosis of COVID-19 infection under section 564(b)(1) of  the Act, 21 U  S C  867DPK-1(S)(0), unless the authorization is terminated  or revoked sooner  The test has been validated but independent review by FDA  and CLIA is pending  Test performed using [employer] GeneXpert: This RT-PCR assay targets N2,  a region unique to SARS-CoV-2  A conserved region in the E-gene was chosen  for pan-Sarbecovirus detection which includes SARS-CoV-2      According to CMS-2020-01-R, this platform meets the definition of high-throughput technology  Lactic acid [[id number]]  (Normal) Collected: 02/22/23 1403    Lab Status: Final result Specimen: Blood from Arm, Left Updated: 02/22/23 1448     LACTIC ACID 0 9 mmol/L     Narrative:      Result may be elevated if tourniquet was used during collection      Comprehensive metabolic panel [[id number]]  (Abnormal) Collected: 02/22/23 1403    Lab Status: Final result Specimen: Blood from Arm, Left Updated: 02/22/23 1444     Sodium 134 mmol/L      Potassium 4 0 mmol/L      Chloride 101 mmol/L      CO2 25 mmol/L      ANION GAP 8 mmol/L      BUN 11 mg/dL      Creatinine 0 88 mg/dL      Glucose 104 mg/dL      Calcium 9 6 mg/dL      AST 12 U/L      ALT 15 U/L      Alkaline Phosphatase 47 U/L      Total Protein 7 9 g/dL      Albumin 4 7 g/dL      Total Bilirubin 0 63 mg/dL      eGFR 94 ml/min/1 73sq m     Narrative:      [place] guidelines for Chronic Kidney Disease (CKD):   •  Stage 1 with normal or high GFR (GFR > 90 mL/min/1 73 square meters)  •  Stage 2 Mild CKD (GFR = 60-89 mL/min/1 73 square meters)  •  Stage 3A Moderate CKD (GFR = 45-59 mL/min/1 73 square meters)  •  Stage 3B Moderate CKD (GFR = 30-44 mL/min/1 73 square meters)  •  Stage 4 Severe CKD (GFR = 15-29 mL/min/1 73 square meters)  •  Stage 5 End Stage CKD (GFR <15 mL/min/1 73 square meters)  Note: GFR calculation is accurate only with a steady state creatinine    Magnesium [[id number]]  (Abnormal) Collected: 02/22/23 1403    Lab Status: Final result Specimen: Blood from Arm, Left Updated: 02/22/23 1444     Magnesium 1 8 mg/dL     Protime-INR [[id number]]  (Normal) Collected: 02/22/23 1403    Lab Status: Final result Specimen: Blood from Arm, Left Updated: 02/22/23 1438     Protime 13 9 seconds      INR 1 05    APTT [[id number]]  (Normal) Collected: 02/22/23 1403    Lab Status: Final result Specimen: Blood from Arm, Left Updated: 02/22/23 1438     PTT 30 seconds     CBC and differential [[id number]]  (Abnormal) Collected: 02/22/23 1403    Lab Status: Final result Specimen: Blood from Arm, Left Updated: 02/22/23 1418     WBC 11 94 Thousand/uL      RBC 5 28 Million/uL      Hemoglobin 14 8 g/dL      Hematocrit 45 5 %      MCV 86 fL      MCH 28 0 pg      MCHC 32 5 g/dL      RDW 13 1 %      MPV 8 4 fL      Platelets 400 Thousands/uL      nRBC 0 /100 WBCs      Neutrophils Relative 68 %      Immat GRANS % 1 %      Lymphocytes Relative 23 %      Monocytes Relative 8 %      Eosinophils Relative 0 %      Basophils Relative 0 %      Neutrophils Absolute 8 09 Thousands/µL      Immature Grans Absolute 0 07 Thousand/uL      Lymphocytes Absolute 2 79 Thousands/µL      Monocytes Absolute 0 91 Thousand/µL      Eosinophils Absolute 0 04 Thousand/µL      Basophils Absolute 0 04 Thousands/µL     Wound culture and Gram stain [[id number]] Collected: 02/22/23 1410    Lab Status: In process Specimen: Wound from Hand, Left Updated: 02/22/23 1414                 CT upper extremity w contrast left   Final Result by Radha Kinsey MD (02/22 1515)      Mild soft tissue edema 3rd digit at the level of the DIP joint may reflect cellulitis  No discrete abscess or soft tissue gas  No osseous erosion  Workstation performed: [id number]                    Procedures  Procedures         ED Course  ED Course as of 02/22/23 2124   Wed Feb 22, 2023   1427 WBC(!): 11 94   1512 Creatinine: 0 88                               SBIRT 22yo+    Flowsheet Row Most Recent Value   SBIRT (23 yo +)    In order to provide better care to our patients, we are screening all of our patients for alcohol and drug use  Would it be okay to ask you these screening questions?  No Filed at: 02/22/2023 1415                    Medical Decision Making  Infection of finger: acute illness or injury     Details: Patient with cellulitis of finger  CT without evidence of fluid collection or abscess  No bone necrosis noted  Discussed case with Dr Sarita Goodwin of orthopedics who will see patient tomorrow at clinic  Patient is agreeable to this  Given IV clindamycin in ED and discharged with Bactrim  Patient educated on red flag symptoms that would necessitate return to the ED  Amount and/or Complexity of Data Reviewed  External Data Reviewed: labs and notes  Labs: ordered  Decision-making details documented in ED Course  Radiology: ordered  Discussion of management or test interpretation with external provider(s): Discussed with Dr Geeta Hemphill of orthopedic surgery who is in agreement with trial of oral antibiotics and follow-up in clinic    Risk  Prescription drug management  Disposition  Final diagnoses:   Infection of finger     Time reflects when diagnosis was documented in both MDM as applicable and the Disposition within this note     Time User Action Codes Description Comment    2/22/2023  3:50 PM Talmage Holter Add [L08 9] Infection of finger       ED Disposition     ED Disposition   Discharge    Condition   Stable    Date/Time   Wed Feb 22, 2023  3:49 PM    Comment   Stanley Prater discharge to home/self care                 Follow-up Information     Follow up With Specialties Details Why [address], [facility] Nurse Practitioner Call  As needed [address]  [phone]      Carrington Horvath MD Orthopedic Surgery, Hand Surgery Schedule an appointment as soon as possible for a visit  can see you tomorrow in office [address] [address]  [phone]            Discharge Medication List as of 2/22/2023  4:00 PM      START taking these medications    Details   ibuprofen (MOTRIN) 600 mg tablet Take 1 tablet (600 mg total) by mouth every 6 (six) hours as needed for moderate pain for up to 7 days, Starting Wed 2/22/2023, Until Wed 3/1/2023 at 2359, Normal      oxyCODONE (ROXICODONE) 5 immediate release tablet Take 1 tablet (5 mg total) by mouth every 4 (four) hours as needed for moderate pain for up to 3 days Max Daily Amount: 30 mg, Starting Wed 2/22/2023, Until Sat 2/25/2023 at 2359, Normal      sulfamethoxazole-trimethoprim (BACTRIM DS) 800-160 mg per tablet Take 1 tablet by mouth 2 (two) times a day for 7 days smx-tmp DS (BACTRIM) 800-160 mg tabs (1tab q12 D10), Starting Wed 2/22/2023, Until Wed 3/1/2023, Normal         CONTINUE these medications which have NOT CHANGED    Details   Apixaban (ELIQUIS PO) Take by mouth, Historical Med      ATORVASTATIN CALCIUM PO Take by mouth in the morning, Historical Med      clobetasol (TEMOVATE) 0 05 % ointment Apply topically 2 (two) times a day To bumps on forearms for 4 weeks, Starting Wed 7/20/2022, Normal      cyclobenzaprine (FLEXERIL) 10 mg tablet Take 10 mg by mouth 3 (three) times a day as needed for muscle spasms, Historical Med      DULoxetine (CYMBALTA) 30 mg delayed release capsule Take 90 mg by mouth daily, Until Discontinued, Historical Med      FENOFIBRATE PO Take 145 mg by mouth in the morning, Historical Med      Glucos-Giuseppe-MSM-Du-W-VdRg-SeCu (DURAFLEX PO) Take 10 mg by mouth daily, Until Discontinued, Historical Med      Melatonin 10 MG CAPS Take 10 mg by mouth, Historical Med      METFORMIN HCL ER, MOD, PO Take 500 mg by mouth 2 (two) times a day, Historical Med      naproxen (NAPROSYN) 500 mg tablet Take 1 tablet by mouth 2 (two) times a day with meals for 10 days, Starting 11/22/2016, Until Fri 12/2/16, Print      oxyCODONE-acetaminophen (PERCOCET) 5-325 mg per tablet Take 1 tablet by mouth every 4 (four) hours as needed for moderate pain, Historical Med      Paxlovid, 300/100, tablet therapy pack Starting Fri 12/9/2022, Historical Med      TIZANIDINE HCL PO Take by mouth daily at bedtime, Historical Med                 PDMP Review     None          ED Provider  Electronically Signed by           Claude Schroeder, PA-C  02/22/23 8391

## 2023-02-23 ENCOUNTER — OFFICE VISIT (OUTPATIENT)
Dept: OBGYN CLINIC | Facility: CLINIC | Age: 58
End: 2023-02-23

## 2023-02-23 VITALS
HEIGHT: 69 IN | BODY MASS INDEX: 31.1 KG/M2 | WEIGHT: 210 LBS | SYSTOLIC BLOOD PRESSURE: 139 MMHG | HEART RATE: 80 BPM | DIASTOLIC BLOOD PRESSURE: 84 MMHG

## 2023-02-23 DIAGNOSIS — L08.9 OPEN WOUND OF FINGER, INFECTED, INITIAL ENCOUNTER: Primary | ICD-10-CM

## 2023-02-23 DIAGNOSIS — L08.9 INFECTION OF FINGER: ICD-10-CM

## 2023-02-23 DIAGNOSIS — S61.209A OPEN WOUND OF FINGER, INFECTED, INITIAL ENCOUNTER: Primary | ICD-10-CM

## 2023-02-23 NOTE — PROGRESS NOTES
ORTHOPAEDIC HAND, WRIST, AND ELBOW OFFICE  VISIT       ASSESSMENT/PLAN:      61 y o male who presents with Infected wound on left long finger DOI 2/20/2023    I and D performed today  Wound packed with gauze and coband around digit  Leave packing in till morning 2/24/2023  Advised to do warm soapy water soaks tomorrow after removing the dressing  Finish antibiotics  NSAID's for pain relief    The patient verbalized understanding of exam findings and treatment plan  We engaged in the shared decision-making process and treatment options were discussed at length with the patient  Surgical and conservative management discussed today along with risks and benefits  Diagnoses and all orders for this visit:    Open wound of finger, infected, initial encounter    Other orders  -     Incision and drain        Follow Up:  Return in about 4 days (around 2/27/2023) for Recheck  To Do Next Visit:  Re-evaluation of current issue            ____________________________________________________________________________________________________________________________________________      CHIEF COMPLAINT:  Chief Complaint   Patient presents with   • Left Hand - Pain       SUBJECTIVE:  Mili Kendrick is a 61y o  year old  male who presents today for evaluation of Left middle finger pain DOI 2/20/2023   Pt reports he was going to slice a raw turkey breast with a butter knife  Pt states that he sliced through and cut the dorsum of his Left middle finger  Pt states he wrapped his finger up at home  He states the wound kept bleeding  The next day he noticed swelling and pain and decreased motion   He decided to seek medical attention  Pt was seen in ED on 2/22/2023  Pt states that his wound stopped draining yesterday but then reopened this morning  Pt was given Bacterium for 7 days  He denies fever, chills, nausea, vomiting    I have personally reviewed all the relevant PMH, PSH, SH, FH, Medications and allergies      PAST MEDICAL HISTORY:  Past Medical History:   Diagnosis Date   • Diabetes mellitus ([facility] [address] )    • Migraine        PAST SURGICAL HISTORY:  Past Surgical History:   Procedure Laterality Date   • HIP SURGERY     • KNEE SURGERY     • SHOULDER SURGERY         FAMILY HISTORY:  History reviewed  No pertinent family history      SOCIAL HISTORY:  Social History     Tobacco Use   • Smoking status: Every Day     Types: Cigars   • Smokeless tobacco: Never   Vaping Use   • Vaping Use: Never used   Substance Use Topics   • Alcohol use: No   • Drug use: No       MEDICATIONS:    Current Outpatient Medications:   •  ATORVASTATIN CALCIUM PO, Take by mouth in the morning, Disp: , Rfl:   •  clobetasol (TEMOVATE) 0 05 % ointment, Apply topically 2 (two) times a day To bumps on forearms for 4 weeks, Disp: 30 g, Rfl: 0  •  DULoxetine (CYMBALTA) 30 mg delayed release capsule, Take 90 mg by mouth daily, Disp: , Rfl:   •  FENOFIBRATE PO, Take 145 mg by mouth in the morning, Disp: , Rfl:   •  ibuprofen (MOTRIN) 600 mg tablet, Take 1 tablet (600 mg total) by mouth every 6 (six) hours as needed for moderate pain for up to 7 days, Disp: 28 tablet, Rfl: 0  •  Melatonin 10 MG CAPS, Take 10 mg by mouth, Disp: , Rfl:   •  METFORMIN HCL ER, MOD, PO, Take 500 mg by mouth 2 (two) times a day, Disp: , Rfl:   •  oxyCODONE (ROXICODONE) 5 immediate release tablet, Take 1 tablet (5 mg total) by mouth every 4 (four) hours as needed for moderate pain for up to 3 days Max Daily Amount: 30 mg, Disp: 10 tablet, Rfl: 0  •  sulfamethoxazole-trimethoprim (BACTRIM DS) 800-160 mg per tablet, Take 1 tablet by mouth 2 (two) times a day for 7 days smx-tmp DS (BACTRIM) 800-160 mg tabs (1tab q12 D10), Disp: 14 tablet, Rfl: 0  •  TIZANIDINE HCL PO, Take by mouth daily at bedtime, Disp: , Rfl:   •  Apixaban (ELIQUIS PO), Take by mouth (Patient not taking: Reported on 2/22/2023), Disp: , Rfl:   •  cyclobenzaprine (FLEXERIL) 10 mg tablet, Take 10 mg by mouth 3 (three) times a day as needed for muscle spasms, Disp: , Rfl:   •  Glucos-Giuseppe-MSM-Ye-P-WlGl-SeCu (DURAFLEX PO), Take 10 mg by mouth daily, Disp: , Rfl:   •  naproxen (NAPROSYN) 500 mg tablet, Take 1 tablet by mouth 2 (two) times a day with meals for 10 days, Disp: 20 tablet, Rfl: 0  •  oxyCODONE-acetaminophen (PERCOCET) 5-325 mg per tablet, Take 1 tablet by mouth every 4 (four) hours as needed for moderate pain (Patient not taking: Reported on 2/23/2023), Disp: , Rfl:   •  Paxlovid, 300/100, tablet therapy pack, , Disp: , Rfl:   No current facility-administered medications for this visit  ALLERGIES:  Allergies   Allergen Reactions   • Biaxin [Clarithromycin]    • Duricef [Cefadroxil]    • Penicillins Hives, Rash and Swelling           REVIEW OF SYSTEMS:  Review of Systems   Constitutional: Negative for chills and fever  HENT: Negative for ear pain and sore throat  Eyes: Negative for pain and visual disturbance  Respiratory: Negative for cough and shortness of breath  Cardiovascular: Negative for chest pain and palpitations  Gastrointestinal: Negative for abdominal pain and vomiting  Genitourinary: Negative for dysuria and hematuria  Musculoskeletal: Negative for arthralgias and back pain  Skin: Positive for wound  Negative for color change and rash  Neurological: Negative for seizures and syncope  All other systems reviewed and are negative        VITALS:  Vitals:    02/23/23 1300   BP: 139/84   Pulse: 80       LABS:  HgA1c: No results found for: HGBA1C  BMP:   Lab Results   Component Value Date    GLUCOSE 124 (H) 01/01/2016    CALCIUM 9 6 02/22/2023     01/01/2016    K 4 0 02/22/2023    CO2 25 02/22/2023     02/22/2023    BUN 11 02/22/2023    CREATININE 0 88 02/22/2023       _____________________________________________________  PHYSICAL EXAMINATION:  General: well developed and well nourished, alert, oriented times 3 and appears comfortable  Psychiatric: Normal  HEENT: Normocephalic, Atraumatic Trachea Midline, No torticollis  Pulmonary: No audible wheezing or respiratory distress   Abdomen/GI: Non tender, non distended   Cardiovascular: No pitting edema, 2+ radial pulse   Skin: No Masses, Erthyema , Laceration , Fluctuation , No Ulcerations  Neurovascular: Sensation Intact to the Median, Ulnar, Radial Nerve, Motor Intact to the Median, Ulnar, Radial Nerve and Pulses Intact  Musculoskeletal: Normal, except as noted in detailed exam and in HPI  MUSCULOSKELETAL EXAMINATION:  Severe swelling Left long finger  Erythema  Minimal Serosanguinous drainage from dorsal wound distal to the DIP joint  ___________________________________________________  STUDIES REVIEWED:  No images reviewed          PROCEDURES PERFORMED:  Incision and drain    Date/Time: 2/23/2023 1:21 PM  Performed by: Uriah San MD  Authorized by: Uriah San MD   Universal Protocol:  Consent: Verbal consent obtained  Consent given by: patient  Patient understanding: patient states understanding of the procedure being performed  Patient identity confirmed: verbally with patient      Patient location:  Clinic  Location:     Indications for incision and drainage: Wound infection  Location:  Upper extremity    Upper extremity location:  L long finger  Procedure details:     Complexity:  Simple    Needle aspiration: no      Incision types:  Single straight    Scalpel blade:  11    Incision depth:  Subcutaneous    Wound management:  Irrigated with saline    Irrigation with saline:  Yes    Drainage:  Bloody    Drainage amount: Moderate    Wound treatment:  Packing placed    Packing materials:  1/4 in gauze  Post-procedure details:     Patient tolerance of procedure:   Tolerated well, no immediate complications          _____________________________________________________      Shun Palm    I,:  Unknown Frejose manuel am acting as a scribe while in the presence of the attending physician :       I,:  Uriah San MD personally performed the services described in this documentation    as scribed in my presence :

## 2023-02-24 LAB
BACTERIA WND AEROBE CULT: ABNORMAL
BACTERIA WND AEROBE CULT: ABNORMAL
GRAM STN SPEC: ABNORMAL

## 2023-02-25 LAB
ATRIAL RATE: 69 BPM
P AXIS: 80 DEGREES
PR INTERVAL: 170 MS
QRS AXIS: 53 DEGREES
QRSD INTERVAL: 104 MS
QT INTERVAL: 386 MS
QTC INTERVAL: 413 MS
T WAVE AXIS: 72 DEGREES
VENTRICULAR RATE: 69 BPM

## 2023-02-26 LAB
BACTERIA BLD CULT: NORMAL
BACTERIA BLD CULT: NORMAL

## 2023-02-27 ENCOUNTER — TELEPHONE (OUTPATIENT)
Dept: OBGYN CLINIC | Facility: CLINIC | Age: 58
End: 2023-02-27

## 2023-02-27 LAB
BACTERIA BLD CULT: NORMAL
BACTERIA BLD CULT: NORMAL

## 2023-02-27 NOTE — TELEPHONE ENCOUNTER
Caller: wife/Maria    Doctor: Portillo Elizabeth    Reason for call: Magdalena Bess is treating for open wound/infection of his finger and was given antibiotic and Motrin in ED  He is feeling sick and lethargic and not sure if this is a reaction  Is there anything else he can take?   He uses [facility]#: [phone]    Thank you

## 2023-02-27 NOTE — TELEPHONE ENCOUNTER
Spoke to patient and he states the finger looks white, he is putting ointment and a dressing on it  He will send a picture thru mychart  Advised he should not put anything on that Dr did not advise him to  Finger looks good,  no pain or purulent drainage or redness or swelling  No fever    Patient states about 3 days after taking the bactrim with the ibuprofen 600 mg,  he started with stomach upset  He has been taking both pills together  Advised that he should stop the ibuprofen and take tylenol instead due to stomach upset  Make sure he eats something when taking the Bactrim  See if this helps with the stomach upset  Call if no improvement

## 2023-02-27 NOTE — TELEPHONE ENCOUNTER
Correct instructions  No ointments, just warm soapy soaks  Bactrim can cause GI upset    He has follow-up appointment with us tomorrow, will evaluate then,  Thank you

## 2023-02-27 NOTE — TELEPHONE ENCOUNTER
Reviewed  Skin looks macerated  No ointments, just soapy soaks and then dry  F/U in office tomorrow      Thanks

## 2023-02-28 ENCOUNTER — OFFICE VISIT (OUTPATIENT)
Dept: OBGYN CLINIC | Facility: CLINIC | Age: 58
End: 2023-02-28

## 2023-02-28 VITALS
WEIGHT: 211 LBS | DIASTOLIC BLOOD PRESSURE: 92 MMHG | HEIGHT: 69 IN | SYSTOLIC BLOOD PRESSURE: 149 MMHG | HEART RATE: 90 BPM | BODY MASS INDEX: 31.25 KG/M2

## 2023-02-28 DIAGNOSIS — L08.9 FINGER INFECTION: Primary | ICD-10-CM

## 2023-02-28 RX ORDER — FENOFIBRATE 145 MG/1
1 TABLET, COATED ORAL DAILY
COMMUNITY

## 2023-02-28 RX ORDER — METFORMIN HYDROCHLORIDE 500 MG/5ML
SOLUTION ORAL EVERY 24 HOURS
COMMUNITY

## 2023-02-28 RX ORDER — ATORVASTATIN CALCIUM 10 MG/1
1 TABLET, FILM COATED ORAL EVERY EVENING
COMMUNITY

## 2023-02-28 NOTE — PROGRESS NOTES
Assessment:    Left long finger infection s/p I&D in clinic 2/23/2023  DOI:  2/20/2023    Plan:    Healing on examination today  Continue warm soapy soaks 3-4 x / day  Finish oral antibiotic  Can cover with band-aid for periods throughout the day  Follow-up 1 week for repeat wound check  Subjective:     HPI    Patient ID:  Ema Cruz is a 61 y o  male presenting for follow-up left long finger infection  He underwent I&D in clinic at last visit, and was advised on finishing oral antibiotics and warm soapy soaks  Today, the patient states overall his pain is slowly improving  It is now only at the distal end of the finger, as opposed to whole finger pain last week  Has some numbness at the fingertip  Wound was macerated, but improved  Has some clear bloody drainage, mild, but nothing consistent  Has not noticed any pus drainage  The following portions of the patient's history were reviewed and updated as appropriate: allergies, current medications, past family history, past medical history, past social history, past surgical history, and problem list     Review of Systems     Objective:    Imaging:  None       Physical Exam     Vitals:    02/28/23 1305   BP: 149/92   Pulse: 90       General appearance:  NAD   Cardiac:  Regular rate  Lungs:  Unlabored breathing  Abdomen:  Non-distended    Orthopedic Examination:  Left long finger     Inspection:  Dorsal DIP laceration is scabbing  Small amount of serosanguinous drainage wiped away without persistence  No jong pus drainage  Minimal surrounding erythema at the site of concern  No erythema of the digit otherwise  No fusiform swelling  No resting flexed posture of the digit  Able to achieve full extension  NTTP flexor tendon sheath  Limited ROM at DIP joint due to swelling / pain    Diminished sensation at the fingertip  Good cap refill  Palpable radial pulse

## 2023-03-08 ENCOUNTER — HOSPITAL ENCOUNTER (OUTPATIENT)
Dept: RADIOLOGY | Facility: HOSPITAL | Age: 58
Discharge: HOME | End: 2023-03-08

## 2023-03-08 DIAGNOSIS — M25.511 RIGHT SHOULDER PAIN, UNSPECIFIED CHRONICITY: ICD-10-CM

## 2023-03-15 ENCOUNTER — TELEPHONE (OUTPATIENT)
Dept: OTHER | Facility: OTHER | Age: 58
End: 2023-03-15

## 2023-03-15 NOTE — TELEPHONE ENCOUNTER
Patient is calling regarding cancelling an appointment      Date/Time: 03/15/2023 @ 1:15 pm with Carrie Alvarado MD    Patient was rescheduled: YES [] NO [x]    Patient requesting call back to reschedule: YES [x] NO []

## 2023-03-30 ENCOUNTER — APPOINTMENT (OUTPATIENT)
Dept: LAB | Facility: CLINIC | Age: 58
End: 2023-03-30

## 2023-03-30 DIAGNOSIS — Z01.812 PRE-OPERATIVE LABORATORY EXAMINATION: ICD-10-CM

## 2023-03-30 LAB
ANION GAP SERPL CALCULATED.3IONS-SCNC: 5 MMOL/L (ref 4–13)
APTT PPP: 28 SECONDS (ref 23–37)
BUN SERPL-MCNC: 15 MG/DL (ref 5–25)
CALCIUM SERPL-MCNC: 9.8 MG/DL (ref 8.3–10.1)
CHLORIDE SERPL-SCNC: 107 MMOL/L (ref 96–108)
CO2 SERPL-SCNC: 23 MMOL/L (ref 21–32)
CREAT SERPL-MCNC: 1.15 MG/DL (ref 0.6–1.3)
ERYTHROCYTE [DISTWIDTH] IN BLOOD BY AUTOMATED COUNT: 13 % (ref 11.6–15.1)
GFR SERPL CREATININE-BSD FRML MDRD: 69 ML/MIN/1.73SQ M
GLUCOSE SERPL-MCNC: 133 MG/DL (ref 65–140)
HCT VFR BLD AUTO: 44 % (ref 36.5–49.3)
HGB BLD-MCNC: 14.3 G/DL (ref 12–17)
INR PPP: 1.02 (ref 0.84–1.19)
MCH RBC QN AUTO: 27.7 PG (ref 26.8–34.3)
MCHC RBC AUTO-ENTMCNC: 32.5 G/DL (ref 31.4–37.4)
MCV RBC AUTO: 85 FL (ref 82–98)
PLATELET # BLD AUTO: 343 THOUSANDS/UL (ref 149–390)
PMV BLD AUTO: 8.5 FL (ref 8.9–12.7)
POTASSIUM SERPL-SCNC: 3.7 MMOL/L (ref 3.5–5.3)
PROTHROMBIN TIME: 13.6 SECONDS (ref 11.6–14.5)
RBC # BLD AUTO: 5.16 MILLION/UL (ref 3.88–5.62)
SODIUM SERPL-SCNC: 135 MMOL/L (ref 135–147)
WBC # BLD AUTO: 7.16 THOUSAND/UL (ref 4.31–10.16)

## 2023-06-01 ENCOUNTER — EVALUATION (OUTPATIENT)
Dept: PHYSICAL THERAPY | Facility: CLINIC | Age: 58
End: 2023-06-01

## 2023-06-01 DIAGNOSIS — Z98.890 S/P RIGHT ROTATOR CUFF REPAIR: Primary | ICD-10-CM

## 2023-06-01 NOTE — LETTER
2023    Nam Niño, [address] [place] [address]    Patient: Santana Linares   YOB: 1965   Date of Visit: 2023     Encounter Diagnosis     ICD-10-CM    1  S/P right rotator cuff repair  [id number]           Dear Dr Abe Romero: Thank you for your recent referral of Santana Linares  Please review the attached evaluation summary from [facility] recent visit  Please verify that you agree with the plan of care by signing the attached order  If you have any questions or concerns, please do not hesitate to call  I sincerely appreciate the opportunity to share in the care of one of your patients and hope to have another opportunity to work with you in the near future  Sincerely,    Jarrett Medina, PT      Referring Provider:      I certify that I have read the below Plan of Care and certify the need for these services furnished under this plan of treatment while under my care  Nam Niño MD  [address] [place] [address]  Via Fax: [phone]          PT Evaluation     Today's date: 2023  Patient name: Santana Linares  : 1965  MRN: [id number]  Referring provider: Bernice Quinteros MD  Dx:   Encounter Diagnosis     ICD-10-CM    1  S/P right rotator cuff repair  [id number]                      Assessment  Assessment details: Santana Linares is a 61 y o  male who presents with pain, decreased strength, decreased ROM and decreased joint mobility  Due to these impairments, patient has difficulty performing ADL's, recreational activities, lifting/carrying, transfers, reaching  Patient's clinical presentation is consistent with their referring diagnosis of S/P right rotator cuff repair  (primary encounter diagnosis)    Patient has been educated in post-op contraindications / precautions, home exercise program and plan of care   Patient would benefit from skilled physical therapy services to address their aforementioned functional limitations and "progress towards prior level of function and independence with home exercise program    Impairments: abnormal or restricted ROM, abnormal movement, activity intolerance, impaired physical strength, lacks appropriate home exercise program, pain with function, poor posture  and poor body mechanics  Barriers to therapy: Patient was educated on typical protocol+timeline following RTC repairs including progression starting from [facility] with patient stating he has been using his arm for most ADL's and has tried lifting light weight items  Importance of following guidelines was re-itirated w/ patient stating he \"has a hard time listening to instructions  \"    Goals  Short Term Goals to be accomplished in 4 weeks:  STG1: Pt will be I with HEP to maximize progress between therapy sessions  STG2: Pt will be I with posture management   STG3: Pt will demo 50% inc in shoulder AROM to improve self care and household ADLs  STG4: Pt will demo 1/2 MMT strength in shoulder  STG5: Pt will report pain 4/10 in shoulder     Long Term Goals to be accomplished in 12 weeks:   LTG1: Pt will demo full shoulder AROM   LTG2: Pt will return to work/household ADLs pain free as per PLOF  LTG3: Pt will demo shoulder strength WNL to allow lifting/carrying  Plan  Plan details: HEP development, stretching, strengthening, A/AA/PROM, joint mobilizations, posture education, STM/MI as needed to reduce muscle tension, muscle reeducation, PLOC discussed and agreed upon with patient      Patient would benefit from: PT eval and skilled physical therapy  Planned modality interventions: cryotherapy and thermotherapy: hydrocollator packs  Planned therapy interventions: home exercise program, therapeutic exercise, therapeutic activities, self care, patient education, manual therapy and neuromuscular re-education  Frequency: 2x week  Plan of Care beginning date: 6/1/2023  Plan of Care expiration date: 8/24/2023  Treatment plan discussed " with: patient        Subjective Evaluation    History of Present Illness  Mechanism of injury: surgery  Mechanism of injury: Patient presents s/p R RTC repair performed on 23  States he has had several shoulder surgeries bilateral and will need a L TSA in the future  Patient reports pain and limited mobility w/ his R shldr since the surgery  Notes a weakness when attempting to use the arm ie; getting milk out of the fridge  Notes pain w/ sleeping especially and with getting up in the morning  Difficulty w/ reaching/lifting light weight items  Notes pain at his end ranges of motionStates he has been doing activities at home that his surgeon did not recommend including lifting and using his arm directly after his surgery  States he only wore sling for approx 5 days  Pt has had multiple orthopedic surgeries including 4 on the L shldr and 2 on the R  Hx of hip/knee replacements  States he was a   Currently retired     Pain  Current pain ratin  At best pain ratin  At worst pain rating: 10  Location: R shoulder   Quality: tight, dull ache and discomfort  Relieving factors: medications (getting in/oob, movement )  Aggravating factors: lifting and overhead activity  Progression: improved    Social Support    Working: retired   Patient Goals  Patient goals for therapy: decreased pain, increased motion, increased strength, independence with ADLs/IADLs and return to sport/leisure activities          Objective       Posture: Rounded shoulders     AROM: standing R  L  Shoulder flex        140*  155  Shoulder abd       120*  150  Functional ER  NT  unable  Functional IR  NT  Sacrum*    PROM:  R  L  Shoulder flex        150*  155  Shoulder abd       130*  150  ER @ neutral   40  40  IR @ neutral  40  40    MMT:    R  L  Shoulder flex  3/5*  4-/5  Shoulder abd  3/5*  4-/5  IR   3+/5  4-/5  ER   3/5*  4-/5    Tenderness/Palpation:  + TTP supraspinatus   + TTP near LH biceps tendon    Joint Mobility:   + Hypomobility noted post/inf         Precautions:   Past Medical History:   Diagnosis Date   • Diabetes mellitus ([facility] [address] )    • Migraine      SOC: 6/1/23  FOTO: 6/1/23  POC Expiration: 8/24/23   Daily Treatment Log:  Date 6/1/23       Visit # 1       Manual                        Ther Exer        Pulleys         Wall slides         Cane abd         Cane ext        Rows tube        Shldr ext tube        Sup OH flex cane        Sup ER str cane                                        HEP        Ther Activ                                                        NMReed        RTC iso's         IR/ER walkouts                                                                 Modalities                                   Access Code: [email or web address]  URL: [email or web address]  Date: 06/01/2023  Prepared by: Toña Allen    Exercises  - Bilateral Shoulder Flexion Wall Slide with Towel  - 1 x daily - 7 x weekly - 2 sets - 10 reps - 5 hold  - Standing Shoulder Abduction ROM with Dowel  - 1 x daily - 7 x weekly - 2 sets - 10 reps - 5 hold  - Standing Isometric Shoulder External Rotation with Doorway  - 1 x daily - 7 x weekly - 10 reps - 5 hold  - Isometric Shoulder Abduction at Wall  - 1 x daily - 7 x weekly - 10 reps - 5 hold

## 2023-06-01 NOTE — PROGRESS NOTES
"PT Evaluation     Today's date: 2023  Patient name: Morgan Sellers  : 1965  MRN: [id number]  Referring provider: Familia Pedersen MD  Dx:   Encounter Diagnosis     ICD-10-CM    1  S/P right rotator cuff repair  Z98 890                      Assessment  Assessment details: Morgan Sellers is a 61 y o  male who presents with pain, decreased strength, decreased ROM and decreased joint mobility  Due to these impairments, patient has difficulty performing ADL's, recreational activities, lifting/carrying, transfers, reaching  Patient's clinical presentation is consistent with their referring diagnosis of S/P right rotator cuff repair  (primary encounter diagnosis)    Patient has been educated in post-op contraindications / precautions, home exercise program and plan of care  Patient would benefit from skilled physical therapy services to address their aforementioned functional limitations and progress towards prior level of function and independence with home exercise program    Impairments: abnormal or restricted ROM, abnormal movement, activity intolerance, impaired physical strength, lacks appropriate home exercise program, pain with function, poor posture  and poor body mechanics  Barriers to therapy: Patient was educated on typical protocol+timeline following RTC repairs including progression starting from [facility] with patient stating he has been using his arm for most ADL's and has tried lifting light weight items  Importance of following guidelines was re-itirated w/ patient stating he \"has a hard time listening to instructions  \"    Goals  Short Term Goals to be accomplished in 4 weeks:  STG1: Pt will be I with HEP to maximize progress between therapy sessions  STG2: Pt will be I with posture management   STG3: Pt will demo 50% inc in shoulder AROM to improve self care and household ADLs    STG4: Pt will demo 1/2 MMT strength in shoulder  STG5: Pt will report pain 4/10 in shoulder   " Long Term Goals to be accomplished in 12 weeks:   LTG1: Pt will demo full shoulder AROM   LTG2: Pt will return to work/household ADLs pain free as per PLOF  LTG3: Pt will demo shoulder strength WNL to allow lifting/carrying  Plan  Plan details: HEP development, stretching, strengthening, A/AA/PROM, joint mobilizations, posture education, STM/MI as needed to reduce muscle tension, muscle reeducation, PLOC discussed and agreed upon with patient  Patient would benefit from: PT eval and skilled physical therapy  Planned modality interventions: cryotherapy and thermotherapy: hydrocollator packs  Planned therapy interventions: home exercise program, therapeutic exercise, therapeutic activities, self care, patient education, manual therapy and neuromuscular re-education  Frequency: 2x week  Plan of Care beginning date: 2023  Plan of Care expiration date: 2023  Treatment plan discussed with: patient        Subjective Evaluation    History of Present Illness  Mechanism of injury: surgery  Mechanism of injury: Patient presents s/p R RTC repair performed on 23  States he has had several shoulder surgeries bilateral and will need a L TSA in the future  Patient reports pain and limited mobility w/ his R shldr since the surgery  Notes a weakness when attempting to use the arm ie; getting milk out of the fridge  Notes pain w/ sleeping especially and with getting up in the morning  Difficulty w/ reaching/lifting light weight items  Notes pain at his end ranges of motionStates he has been doing activities at home that his surgeon did not recommend including lifting and using his arm directly after his surgery  States he only wore sling for approx 5 days  Pt has had multiple orthopedic surgeries including 4 on the L shldr and 2 on the R  Hx of hip/knee replacements  States he was a   Currently retired     Pain  Current pain ratin  At best pain ratin  At worst pain rating: 10  Location: R shoulder   Quality: tight, dull ache and discomfort  Relieving factors: medications (getting in/oob, movement )  Aggravating factors: lifting and overhead activity  Progression: improved    Social Support    Working: retired   Patient Goals  Patient goals for therapy: decreased pain, increased motion, increased strength, independence with ADLs/IADLs and return to sport/leisure activities          Objective       Posture: Rounded shoulders     AROM: standing R  L  Shoulder flex        140*  155  Shoulder abd       120*  150  Functional ER  NT  unable  Functional IR  NT  Sacrum*    PROM:  R  L  Shoulder flex        150*  155  Shoulder abd       130*  150  ER @ neutral   40  40  IR @ neutral  40  40    MMT:    R  L  Shoulder flex  3/5*  4-/5  Shoulder abd  3/5*  4-/5  IR   3+/5  4-/5  ER   3/5*  4-/5    Tenderness/Palpation:  + TTP supraspinatus   + TTP near LH biceps tendon    Joint Mobility:   + Hypomobility noted post/inf          Precautions:   Past Medical History:   Diagnosis Date   • [facility]    • Migraine      SOC: 6/1/23  FOTO: 6/1/23  POC Expiration: 8/24/23   Daily Treatment Log:  Date 6/1/23       Visit # 1       Manual                        Ther Exer        Pulleys         Wall slides         Cane abd         Cane ext        Rows tube        Shldr ext tube        Sup OH flex cane        Sup ER str cane                                        HEP        Ther Activ                                                        NMReed        RTC iso's         IR/ER walkouts                                                                 Modalities                                   Access Code: [email or web address]  URL: [email or web address]  Date: 06/01/2023  Prepared by: Chilo Hughes    Exercises  - Bilateral Shoulder Flexion Wall Slide with Towel  - 1 x daily - 7 x weekly - 2 sets - 10 reps - 5 hold  - Standing Shoulder Abduction ROM with Dowel  - 1 x daily - 7 x weekly - 2 sets - 10 reps - 5 hold  - Standing Isometric Shoulder External Rotation with Doorway  - 1 x daily - 7 x weekly - 10 reps - 5 hold  - Isometric Shoulder Abduction at Wall  - 1 x daily - 7 x weekly - 10 reps - 5 hold

## 2023-06-06 ENCOUNTER — OFFICE VISIT (OUTPATIENT)
Dept: PHYSICAL THERAPY | Facility: CLINIC | Age: 58
End: 2023-06-06
Payer: COMMERCIAL

## 2023-06-06 DIAGNOSIS — Z98.890 S/P RIGHT ROTATOR CUFF REPAIR: Primary | ICD-10-CM

## 2023-06-06 PROCEDURE — 97112 NEUROMUSCULAR REEDUCATION: CPT

## 2023-06-06 PROCEDURE — 97110 THERAPEUTIC EXERCISES: CPT

## 2023-06-06 NOTE — PROGRESS NOTES
"Daily Note     Today's date: 2023  Patient name: Ranell Sandifer  : 1965  MRN: [id number]  Referring provider: Memo Horne MD  Dx:   Encounter Diagnosis     ICD-10-CM    1  S/P right rotator cuff repair  Z98 890                      Subjective: pt reports that his shoulder is sore, sleeping has been really tough for him  No problems with the exercises given at IE, \"I just dont have any strength\"       Objective: See treatment diary below      Assessment: Tolerated treatment well  Pt re-edu that he has no strength bc he should not be moving his shoulder actively at this point and it not yet the time to be working on strength, just his AAROM  T/o session pt consistently performs exercises with holds that were not mean to be held and pushing into discomfort, was advised some exercises are meant to be held for a stretch and others are not, emphasized he should not be pushing into pain  Patient would benefit from continued PT      Plan: Continue per plan of care  Precautions:   Past Medical History:   Diagnosis Date   • [facility]    • Migraine      SOC: 23  FOTO: 23  POC Expiration: 23   Daily Treatment Log:  Date 23      Visit # 1 2       Manual                        Ther Exer  30'       Pulleys   5'       Wall slides   5\"x15       Cane abd   1x10       Cane ext  5\"x10       Rows tube  blue tubing 2x10       Shldr ext tube  grn tubing 2x10       Sup OH flex cane  5\"x5       Sup ER str cane  5\"x10                                       HEP        Ther Activ                                                        NMReed  10'       RTC iso's   5\"x10 flex/ext/IR/ER  Pain w/ abd        IR/ER walkouts   grn tubing 1x10 ea VC for form and to avoid holding exercise                                                               Modalities                                   Access Code: [email or web address]  URL: [email or web address]  Date: 2023  Prepared by: [address] [facility] Darryl    Exercises  - Bilateral Shoulder Flexion Wall Slide with Towel  - 1 x daily - 7 x weekly - 2 sets - 10 reps - 5 hold  - Standing Shoulder Abduction ROM with Dowel  - 1 x daily - 7 x weekly - 2 sets - 10 reps - 5 hold  - Standing Isometric Shoulder External Rotation with Doorway  - 1 x daily - 7 x weekly - 10 reps - 5 hold  - Isometric Shoulder Abduction at Wall  - 1 x daily - 7 x weekly - 10 reps - 5 hold

## 2023-06-09 ENCOUNTER — APPOINTMENT (OUTPATIENT)
Dept: PHYSICAL THERAPY | Facility: CLINIC | Age: 58
End: 2023-06-09
Payer: COMMERCIAL

## 2023-06-09 ENCOUNTER — TELEPHONE (OUTPATIENT)
Dept: PHYSICAL THERAPY | Facility: CLINIC | Age: 58
End: 2023-06-09

## 2023-06-09 NOTE — TELEPHONE ENCOUNTER
Pt called to cancel his appt for today due to his car still being in the shop and not having anyone to bring him

## 2023-06-12 ENCOUNTER — OFFICE VISIT (OUTPATIENT)
Dept: PHYSICAL THERAPY | Facility: CLINIC | Age: 58
End: 2023-06-12
Payer: COMMERCIAL

## 2023-06-12 DIAGNOSIS — Z98.890 S/P RIGHT ROTATOR CUFF REPAIR: Primary | ICD-10-CM

## 2023-06-12 PROCEDURE — 97112 NEUROMUSCULAR REEDUCATION: CPT

## 2023-06-12 PROCEDURE — 97110 THERAPEUTIC EXERCISES: CPT

## 2023-06-12 NOTE — PROGRESS NOTES
"Daily Note     Today's date: 2023  Patient name: Candie Burks  : 1965  MRN: [id number]  Referring provider: Sol Renteria MD  Dx:   Encounter Diagnosis     ICD-10-CM    1  S/P right rotator cuff repair  Z98 890                      Subjective: pt reports that his shoulder is sore, notes he felt sore after last session but reduced after a few hours  Objective: See treatment diary below      Assessment: Tolerated treatment well, remains challenged by AAROM and scap strengthening with mild inc in pain during OH flex str/ER str  Patient demonstrated fatigue post treatment and would benefit from continued PT  Plan: Continue per plan of care  Precautions:   Past Medical History:   Diagnosis Date   • Diabetes mellitus ([facility] )    • Migraine      SOC: 23  FOTO: 23  POC Expiration: 23   Daily Treatment Log:  Date 23     Visit # 1 2  3     Manual                        Ther Exer  30'  30'     Pulleys   5'       Wall slides   5\"x15  5\"x15      Cane abd   1x10  1x10      Cane ext  5\"x10  1x10 bilat     Rows tube  blue tubing 2x10  Blue tube 2x10      Shldr ext tube  grn tubing 2x10  Blue tube 2x10      Sup OH flex cane  5\"x5  5\"x10      Sup ER str cane  5\"x10  10\"x5     SL ER   2x10      SL abd   1x10                     HEP        Ther Activ                                                        NMReed  10'  10'     RTC iso's   5\"x10 flex/ext/IR/ER  Pain w/ abd        IR/ER walkouts   grn tubing 1x10 ea VC for form and to avoid holding exercise  Grn for ER 1x10  Blue for IR 1x10                                                             Modalities                                   Access Code: [email or web address]  URL: [email or web address]  Date: 2023  Prepared by: Terrence Fernandez    Exercises  - Bilateral Shoulder Flexion Wall Slide with Towel  - 1 x daily - 7 x weekly - 2 sets - 10 reps - 5 hold  - Standing Shoulder Abduction ROM with Dowel  - 1 x daily - " 7 x weekly - 2 sets - 10 reps - 5 hold  - Standing Isometric Shoulder External Rotation with Doorway  - 1 x daily - 7 x weekly - 10 reps - 5 hold  - Isometric Shoulder Abduction at Wall  - 1 x daily - 7 x weekly - 10 reps - 5 hold

## 2023-06-15 ENCOUNTER — OFFICE VISIT (OUTPATIENT)
Dept: PHYSICAL THERAPY | Facility: CLINIC | Age: 58
End: 2023-06-15
Payer: COMMERCIAL

## 2023-06-15 DIAGNOSIS — Z98.890 S/P RIGHT ROTATOR CUFF REPAIR: Primary | ICD-10-CM

## 2023-06-15 PROCEDURE — 97112 NEUROMUSCULAR REEDUCATION: CPT

## 2023-06-15 PROCEDURE — 97110 THERAPEUTIC EXERCISES: CPT

## 2023-06-15 NOTE — PROGRESS NOTES
"Daily Note     Today's date: 6/15/2023  Patient name: Andrey Lugo  : 1965  MRN: [id number]  Referring provider: Chanel Dean MD  Dx:   Encounter Diagnosis     ICD-10-CM    1  S/P right rotator cuff repair  Z98 890                      Subjective: pt reports that his soreness has been pretty constant, admits to not reducing his function of his R UE at home  \"if I am vacuuming with my R hand I find I need to switch hands after a short time\"       Objective: See treatment diary below      Assessment: Tolerated treatment well  Pt edu that cont to be non-compliant with the precautions in place for his R shoulder, it is not surprising that his soreness has not decreased  Patient would benefit from continued PT      Plan: Continue per plan of care        Precautions:   Past Medical History:   Diagnosis Date   • Diabetes mellitus ([facility] )    • Migraine      SOC: 23  FOTO: 23  POC Expiration: 23   Daily Treatment Log:  Date 6/1/23 2023 6/12/23 6/15/2023    Visit # 1 2  3 4     Manual                        Ther Exer  30'  30' 30'     Pulleys   5'   5'     Wall slides   5\"x15  5\"x15  5\"x15     Cane uni flex     2x10     Cane abd   1x10  1x10  2x10     Cane ext  5\"x10  1x10 bilat 1x15     Rows tube  blue tubing 2x10  Blue tube 2x10  Blue tubing 2x10     Shldr ext tube  grn tubing 2x10  Blue tube 2x10  Blue tubing 2x10     Sup OH flex cane  5\"x5  5\"x10  5\" 2x10     Sup ER str cane  5\"x10  10\"x5 10\"x5     SL ER   2x10  2x10     SL abd   1x10 2x5                     HEP        Ther Activ                                                        NMReed  10'  10' 8'     RTC iso's   5\"x10 flex/ext/IR/ER  Pain w/ abd        IR/ER walkouts   grn tubing 1x10 ea VC for form and to avoid holding exercise  Grn for ER 1x10  Blue for IR 1x10 Blue tubing 1x10 ER/IR                                                             Modalities                                   Access Code: [email or web address]  URL: " [email or web address]  Date: 06/01/2023  Prepared by: Marilyn Saleem    Exercises  - Bilateral Shoulder Flexion Wall Slide with Towel  - 1 x daily - 7 x weekly - 2 sets - 10 reps - 5 hold  - Standing Shoulder Abduction ROM with Dowel  - 1 x daily - 7 x weekly - 2 sets - 10 reps - 5 hold  - Standing Isometric Shoulder External Rotation with Doorway  - 1 x daily - 7 x weekly - 10 reps - 5 hold  - Isometric Shoulder Abduction at Wall  - 1 x daily - 7 x weekly - 10 reps - 5 hold

## 2023-06-19 ENCOUNTER — TELEPHONE (OUTPATIENT)
Dept: PHYSICAL THERAPY | Facility: CLINIC | Age: 58
End: 2023-06-19

## 2023-06-19 ENCOUNTER — APPOINTMENT (OUTPATIENT)
Dept: PHYSICAL THERAPY | Facility: CLINIC | Age: 58
End: 2023-06-19
Payer: COMMERCIAL

## 2023-06-19 NOTE — TELEPHONE ENCOUNTER
Patient called to cancel due to appt conflict today  Did not want to reschedule, confirmed next appt

## 2023-06-22 ENCOUNTER — OFFICE VISIT (OUTPATIENT)
Dept: PHYSICAL THERAPY | Facility: CLINIC | Age: 58
End: 2023-06-22
Payer: COMMERCIAL

## 2023-06-22 DIAGNOSIS — Z98.890 S/P RIGHT ROTATOR CUFF REPAIR: Primary | ICD-10-CM

## 2023-06-22 PROCEDURE — 97110 THERAPEUTIC EXERCISES: CPT

## 2023-06-22 NOTE — PROGRESS NOTES
"Daily Note     Today's date: 2023  Patient name: Miguel Mayer  : 1965  MRN: [id number]  Referring provider: Razia Kramer MD  Dx:   Encounter Diagnosis     ICD-10-CM    1  S/P right rotator cuff repair  Z98 890                      Subjective: pt reports he has been able to sleep on his R side and hasnt been having much pain, my L shoulder has been bothering me more than my R  \"I think today may be my last appt\"       Objective: See treatment diary below  AROM: standing         R                      L  Shoulder flex               165             140*                 155  Shoulder abd             155               120*                 150  Functional ER             70                 NT                   unable  Functional IR              T10                  NT               Sacrum*    PROM:                       R                L  Shoulder flex              155               150*                 155  Shoulder abd              130             130*                 150  ER @ neutral                                  40                    40  IR @ neutral                                    40                    40      Assessment: Tolerated treatment well  Pt dem improving R shoulder ROM, HEP updated and to return in one week for DC visit  HEP updated and issued and advised to perform until his next visit  Patient exhibited good technique with therapeutic exercises      Plan: Potential discharge next visit  Pt advised that supervising PT needs to see him for DC visit due to him not having a formal RE yet as it is only his 5th visit, pt agreeable to come in one week for DC visit and HEP update       Precautions:   Past Medical History:   Diagnosis Date   • Diabetes mellitus ([facility] )    • Migraine      SOC: 23  FOTO: 23  POC Expiration: 23   Daily Treatment Log:  Date 6/1/23 2023 6/12/23 6/15/2023 2023   Visit # 1 2  3 4  5 FOTO    Manual           " "             Ther Exer  30'  30' 30'  36'    Pulleys   5'   5'  5'    Wall slides   5\"x15  5\"x15  5\"x15  5\"x15 flex/abd    Larey Lis uni flex     2x10  Standing cane flex B/L 10x    Cane abd   1x10  1x10  2x10  15x    Cane ext  5\"x10  1x10 bilat 1x15     Rows tube  blue tubing 2x10  Blue tube 2x10  Blue tubing 2x10  Blue tubing 2x15     Shldr ext tube  grn tubing 2x10  Blue tube 2x10  Blue tubing 2x10  Blue tubing 2x15   Sup OH flex cane  5\"x5  5\"x10  5\" 2x10  2# on cane 5\"x10    Sup ER str cane  5\"x10  10\"x5 10\"x5  10\"x5    SL ER   2x10  2x10  2x10    SL abd   1x10 2x5  2x10   Supine AROM flex      5\" 2x10    Supine RS in 90 flex      1x10 cw/ccw    HEP     Updated/issued    Ther Activ                                                        NMReed  10'  10' 8'     RTC iso's   5\"x10 flex/ext/IR/ER  Pain w/ abd        IR/ER walkouts   grn tubing 1x10 ea VC for form and to avoid holding exercise  Grn for ER 1x10  Blue for IR 1x10 Blue tubing 1x10 ER/IR                                                             Modalities                                   Access Code: [email or web address]  URL: [email or web address]  Date: 06/22/2023  Prepared by: Edwige Meier    Exercises  - Bilateral Shoulder Flexion Wall Slide with Towel  - 1 x daily - 7 x weekly - 2 sets - 10 reps - 5 hold  - Standing Shoulder Abduction Wall Slide with Thumb Out  - 1 x daily - 7 x weekly - 3 sets - 10 reps  - Supine Shoulder External Rotation with Dowel at 20 Degrees of Abduction  - 1 x daily - 7 x weekly - 3 sets - 10 reps  - Supine Alternating Shoulder Flexion  - 1 x daily - 7 x weekly - 3 sets - 10 reps  - Sidelying Shoulder Abduction Full Range of Motion  - 1 x daily - 7 x weekly - 3 sets - 10 reps  - Sidelying Shoulder External Rotation  - 1 x daily - 7 x weekly - 3 sets - 10 reps  - Shoulder Extension with Resistance  - 1 x daily - 7 x weekly - 3 sets - 10 reps  - Standing Shoulder Row with Anchored Resistance  - 1 x daily - 7 x weekly - 3 sets " - 10 reps

## 2023-06-26 ENCOUNTER — APPOINTMENT (OUTPATIENT)
Dept: PHYSICAL THERAPY | Facility: CLINIC | Age: 58
End: 2023-06-26
Payer: COMMERCIAL

## 2023-06-29 ENCOUNTER — TELEPHONE (OUTPATIENT)
Dept: PHYSICAL THERAPY | Facility: CLINIC | Age: 58
End: 2023-06-29

## 2023-06-29 ENCOUNTER — APPOINTMENT (OUTPATIENT)
Dept: PHYSICAL THERAPY | Facility: CLINIC | Age: 58
End: 2023-06-29
Payer: COMMERCIAL

## 2023-06-29 NOTE — TELEPHONE ENCOUNTER
Called to cancel appt due to having sore throat  Today was his last scheduled appt as he was to DC  On cancellation list for next week

## 2023-07-03 ENCOUNTER — HOSPITAL ENCOUNTER (EMERGENCY)
Facility: HOSPITAL | Age: 58
Discharge: HOME | End: 2023-07-03
Attending: EMERGENCY MEDICINE | Admitting: EMERGENCY MEDICINE
Payer: COMMERCIAL

## 2023-07-03 ENCOUNTER — APPOINTMENT (EMERGENCY)
Dept: RADIOLOGY | Facility: HOSPITAL | Age: 58
End: 2023-07-03
Payer: COMMERCIAL

## 2023-07-03 VITALS
OXYGEN SATURATION: 97 % | SYSTOLIC BLOOD PRESSURE: 141 MMHG | RESPIRATION RATE: 39 BRPM | DIASTOLIC BLOOD PRESSURE: 71 MMHG | TEMPERATURE: 98.6 F | HEART RATE: 75 BPM

## 2023-07-03 DIAGNOSIS — Z77.098 CHEMICAL EXPOSURE: Primary | ICD-10-CM

## 2023-07-03 DIAGNOSIS — R05.9 COUGH: ICD-10-CM

## 2023-07-03 PROCEDURE — 93005 ELECTROCARDIOGRAM TRACING: CPT

## 2023-07-03 PROCEDURE — 99284 EMERGENCY DEPT VISIT MOD MDM: CPT

## 2023-07-03 PROCEDURE — 71045 X-RAY EXAM CHEST 1 VIEW: CPT

## 2023-07-03 RX ORDER — KETOROLAC TROMETHAMINE 30 MG/ML
15 INJECTION, SOLUTION INTRAMUSCULAR; INTRAVENOUS ONCE
Status: DISCONTINUED | OUTPATIENT
Start: 2023-07-03 | End: 2023-07-03 | Stop reason: HOSPADM

## 2023-07-03 RX ORDER — SODIUM CHLORIDE FOR INHALATION 0.9 %
3 VIAL, NEBULIZER (ML) INHALATION ONCE
Status: COMPLETED | OUTPATIENT
Start: 2023-07-03 | End: 2023-07-03

## 2023-07-03 RX ORDER — LIDOCAINE 50 MG/G
1 PATCH TOPICAL ONCE
Status: DISCONTINUED | OUTPATIENT
Start: 2023-07-03 | End: 2023-07-03 | Stop reason: HOSPADM

## 2023-07-03 RX ORDER — BENZONATATE 100 MG/1
100 CAPSULE ORAL ONCE
Status: COMPLETED | OUTPATIENT
Start: 2023-07-03 | End: 2023-07-03

## 2023-07-03 RX ADMIN — ISODIUM CHLORIDE 3 ML: 0.03 SOLUTION RESPIRATORY (INHALATION) at 17:54

## 2023-07-03 RX ADMIN — LIDOCAINE 1 PATCH: 50 PATCH TOPICAL at 18:00

## 2023-07-03 RX ADMIN — BENZONATATE 100 MG: 100 CAPSULE ORAL at 17:53

## 2023-07-03 NOTE — ED PROVIDER NOTES
History  Chief Complaint   Patient presents with   • Chemical Exposure     Pt reports opening chlorine tabs and inhaling chlorine tablet powder, causing burning sensation in chest and back pain now as a result from coughing     HPI  Patient is a 50-year-old male history of diabetes, migraine, hyperlipidemia presenting for evaluation of back pain, chest pain, throat pain, cough. Patient states that symptoms started immediately after he opened a container with chlorine tablets for his pool. Patient believes that he inhaled some of the dust and/or gas from the container. Patient states that this first exposure was approximately 2 hours prior to coming to the emergency department. Patient has continued to cough, particularly with deep breaths since exposure. Patient states central chest pain with coughing and breathing, complains of lower back pain after these multiple hours of coughing. Patient denies any preceding illness. Patient denies significant shortness of breath. Patient denies throat closing sensation, is able to clear secretions. Prior to Admission Medications   Prescriptions Last Dose Informant Patient Reported? Taking?    ATORVASTATIN CALCIUM PO   Yes No   Sig: Take by mouth in the morning   Apixaban (ELIQUIS PO)   Yes No   Sig: Take by mouth   Patient not taking: Reported on 2/22/2023   DULoxetine (CYMBALTA) 30 mg delayed release capsule   Yes No   Sig: Take 90 mg by mouth daily   FENOFIBRATE PO   Yes No   Sig: Take 145 mg by mouth in the morning   Glucos-Giuseppe-MSM-Ss-B-CgGl-SeCu (DURAFLEX PO)   Yes No   Sig: Take 10 mg by mouth daily   METFORMIN HCL ER, MOD, PO   Yes No   Sig: Take 500 mg by mouth 2 (two) times a day   Melatonin 10 MG CAPS   Yes No   Sig: Take 10 mg by mouth   Metformin HCl (RIOMET) 500 MG/5ML oral solution   Yes No   Sig: every 24 hours   Paxlovid, 300/100, tablet therapy pack   Yes No   Patient not taking: Reported on 2/22/2023   TIZANIDINE HCL PO   Yes No   Sig: Take by mouth daily at bedtime   apixaban (ELIQUIS) 5 mg   Yes No   Sig: Every 12 hours   atorvastatin (LIPITOR) 10 mg tablet   Yes No   Sig: Take 1 tablet by mouth every evening   clobetasol (TEMOVATE) 0.05 % ointment   No No   Sig: Apply topically 2 (two) times a day To bumps on forearms for 4 weeks   cyclobenzaprine (FLEXERIL) 10 mg tablet   Yes No   Sig: Take 10 mg by mouth 3 (three) times a day as needed for muscle spasms   fenofibrate (TRICOR) 145 mg tablet   Yes No   Sig: Take 1 tablet by mouth daily   ibuprofen (MOTRIN) 600 mg tablet   No No   Sig: Take 1 tablet (600 mg total) by mouth every 6 (six) hours as needed for moderate pain for up to 7 days   naproxen (NAPROSYN) 500 mg tablet   No No   Sig: Take 1 tablet by mouth 2 (two) times a day with meals for 10 days   oxyCODONE-acetaminophen (PERCOCET) 5-325 mg per tablet   Yes No   Sig: Take 1 tablet by mouth every 4 (four) hours as needed for moderate pain      Facility-Administered Medications: None       Past Medical History:   Diagnosis Date   • Diabetes mellitus ([address])    • Migraine        Past Surgical History:   Procedure Laterality Date   • HIP SURGERY     • KNEE SURGERY     • SHOULDER SURGERY         History reviewed. No pertinent family history. I have reviewed and agree with the history as documented. E-Cigarette/Vaping   • E-Cigarette Use Never User      E-Cigarette/Vaping Substances     Social History     Tobacco Use   • Smoking status: Every Day     Types: Cigars   • Smokeless tobacco: Never   Vaping Use   • Vaping Use: Never used   Substance Use Topics   • Alcohol use: No   • Drug use: No       Review of Systems   Constitutional: Negative for chills, fatigue and fever. Respiratory: Positive for choking. Negative for shortness of breath. Cardiovascular: Positive for chest pain. Gastrointestinal: Negative for diarrhea, nausea and vomiting. Musculoskeletal: Negative for arthralgias and myalgias. Neurological: Positive for headaches. Psychiatric/Behavioral: Negative for confusion. All other systems reviewed and are negative. Physical Exam  Physical Exam  Vitals and nursing note reviewed. Constitutional:       General: He is not in acute distress. Appearance: Normal appearance. He is not ill-appearing, toxic-appearing or diaphoretic. HENT:      Head: Normocephalic and atraumatic. Right Ear: External ear normal.      Left Ear: External ear normal.   Eyes:      General:         Right eye: No discharge. Left eye: No discharge. Cardiovascular:      Comments: Regular rate and rhythm, no murmurs rubs or gallops. Extremities warm and well-perfused without mottling  Pulmonary:      Effort: No respiratory distress. Comments: Diffuse trace rhonchi, no wheezes or rails. Able to speak in complete sentences but intermittently coughing. Satting 99% on room air indicating adequate oxygenation. Abdominal:      General: There is no distension. Comments: Abdomen soft, nontender, nondistended without rigidity, rebound, guarding   Musculoskeletal:         General: No deformity. Cervical back: Normal range of motion. Skin:     Findings: No lesion or rash. Neurological:      Mental Status: He is alert and oriented to person, place, and time. Mental status is at baseline.       Comments: AAOx4   Psychiatric:         Mood and Affect: Mood and affect normal.         Vital Signs  ED Triage Vitals [07/03/23 1717]   Temperature Pulse Respirations Blood Pressure SpO2   98.6 °F (37 °C) 87 18 167/82 99 %      Temp Source Heart Rate Source Patient Position - Orthostatic VS BP Location FiO2 (%)   Tympanic Monitor Sitting Left arm --      Pain Score       8           Vitals:    07/03/23 1717   BP: 167/82   Pulse: 87   Patient Position - Orthostatic VS: Sitting         Visual Acuity      ED Medications  Medications   ketorolac (TORADOL) injection 15 mg (0 mg Intramuscular Hold 7/3/23 1800)   lidocaine (LIDODERM) 5 % patch 1 patch (1 patch Topical Medication Applied 7/3/23 1800)   sodium chloride 0.9 % inhalation solution 3 mL (3 mL Nebulization Given 7/3/23 1754)   benzonatate (TESSALON PERLES) capsule 100 mg (100 mg Oral Given 7/3/23 1753)       Diagnostic Studies  Results Reviewed     None                 XR chest 1 view portable    (Results Pending)              Procedures  Procedures         ED Course                               SBIRT 22yo+    Flowsheet Row Most Recent Value   Initial Alcohol Screen: US AUDIT-C     1. How often do you have a drink containing alcohol? 0 Filed at: 07/03/2023 1723   2. How many drinks containing alcohol do you have on a typical day you are drinking? 0 Filed at: 07/03/2023 1723   3a. Male UNDER 65: How often do you have five or more drinks on one occasion? 0 Filed at: 07/03/2023 1723   Audit-C Score 0 Filed at: 07/03/2023 1723   DEOVN: How many times in the past year have you. .. Used an illegal drug or used a prescription medication for non-medical reasons? Never Filed at: 07/03/2023 1723                    Medical Decision Making  I obtained history from the patient. Patient with continued cough and chest pain consistent with pneumonitis following chlorine exposure. Patient mildly hypertensive but otherwise normal vital signs, in no respiratory distress. I ordered and independently interpreted an EKG which demonstrates normal sinus rhythm with sinus arrhythmia rate of 79 without ST or T wave abnormalities. I ordered and independently interpreted a chest x-ray to evaluate for pulmonary edema, pleural effusion, pneumothorax. Per my independent interpretation, patient does not have acute cardiopulmonary abnormality on chest x-ray. I treated patient symptomatically with a saline nebulizer and Tessalon Perle. On reassessment, patient states he feels a lot better. Still intermittently coughing but much less frequently, denies any shortness of breath, continues to have sats in the high 90s. Patient provided with reassurance, discharged with return precautions. Amount and/or Complexity of Data Reviewed  Labs: ordered. Radiology: ordered. Risk  Prescription drug management. Disposition  Final diagnoses:   Chemical exposure   Cough     Time reflects when diagnosis was documented in both MDM as applicable and the Disposition within this note     Time User Action Codes Description Comment    7/3/2023  6:17 PM Fredis Busch Add [O74.496] Chemical exposure     7/3/2023  6:17 PM Fredis Busch Add [R05.9] Cough       ED Disposition     ED Disposition   Discharge    Condition   Stable    Date/Time   Mon Jul 3, 2023  6:17 PM    Comment   Vandana Padilla discharge to home/self care. Follow-up Information     Follow up With Specialties Details Why Contact Info Additional Information    [address] Emergency Department Emergency Medicine  If symptoms worsen [address] Emergency Department, [address] [place] [address], [facility]          Patient's Medications   Discharge Prescriptions    No medications on file       No discharge procedures on file.     PDMP Review     None          ED Provider  Electronically Signed by           Fredis Busch MD  07/03/23 9000

## 2023-07-03 NOTE — DISCHARGE INSTRUCTIONS
If you have any significant worsening shortness of breath, chest pain, confusion, throat swelling sensation, return to the emergency department.

## 2023-07-05 LAB
ATRIAL RATE: 79 BPM
P AXIS: 36 DEGREES
PR INTERVAL: 132 MS
QRS AXIS: 48 DEGREES
QRSD INTERVAL: 100 MS
QT INTERVAL: 372 MS
QTC INTERVAL: 426 MS
T WAVE AXIS: 76 DEGREES
VENTRICULAR RATE: 79 BPM

## 2023-07-05 PROCEDURE — 93010 ELECTROCARDIOGRAM REPORT: CPT | Performed by: INTERNAL MEDICINE

## 2023-08-29 ENCOUNTER — HOSPITAL ENCOUNTER (EMERGENCY)
Facility: HOSPITAL | Age: 58
Discharge: HOME | End: 2023-08-29
Attending: STUDENT IN AN ORGANIZED HEALTH CARE EDUCATION/TRAINING PROGRAM | Admitting: STUDENT IN AN ORGANIZED HEALTH CARE EDUCATION/TRAINING PROGRAM
Payer: COMMERCIAL

## 2023-08-29 VITALS
TEMPERATURE: 97.5 F | HEIGHT: 69 IN | SYSTOLIC BLOOD PRESSURE: 144 MMHG | RESPIRATION RATE: 20 BRPM | OXYGEN SATURATION: 94 % | DIASTOLIC BLOOD PRESSURE: 85 MMHG | BODY MASS INDEX: 31.25 KG/M2 | WEIGHT: 211 LBS | HEART RATE: 90 BPM

## 2023-08-29 DIAGNOSIS — T78.40XA ALLERGIC REACTION, INITIAL ENCOUNTER: Primary | ICD-10-CM

## 2023-08-29 PROCEDURE — 96372 THER/PROPH/DIAG INJ SC/IM: CPT

## 2023-08-29 PROCEDURE — 96374 THER/PROPH/DIAG INJ IV PUSH: CPT

## 2023-08-29 PROCEDURE — 96361 HYDRATE IV INFUSION ADD-ON: CPT

## 2023-08-29 PROCEDURE — 93005 ELECTROCARDIOGRAM TRACING: CPT

## 2023-08-29 PROCEDURE — 99283 EMERGENCY DEPT VISIT LOW MDM: CPT

## 2023-08-29 PROCEDURE — 99291 CRITICAL CARE FIRST HOUR: CPT | Performed by: STUDENT IN AN ORGANIZED HEALTH CARE EDUCATION/TRAINING PROGRAM

## 2023-08-29 RX ORDER — EPINEPHRINE 1 MG/ML
0.5 INJECTION, SOLUTION, CONCENTRATE INTRAVENOUS ONCE
Status: COMPLETED | OUTPATIENT
Start: 2023-08-29 | End: 2023-08-29

## 2023-08-29 RX ORDER — PREDNISONE 20 MG/1
40 TABLET ORAL DAILY
Qty: 8 TABLET | Refills: 0 | Status: SHIPPED | OUTPATIENT
Start: 2023-08-30 | End: 2023-09-03

## 2023-08-29 RX ORDER — LORAZEPAM 2 MG/ML
1 INJECTION INTRAMUSCULAR ONCE
Status: COMPLETED | OUTPATIENT
Start: 2023-08-29 | End: 2023-08-29

## 2023-08-29 RX ORDER — EPINEPHRINE 0.3 MG/.3ML
0.3 INJECTION SUBCUTANEOUS AS NEEDED
Qty: 0.6 ML | Refills: 0 | Status: SHIPPED | OUTPATIENT
Start: 2023-08-29

## 2023-08-29 RX ADMIN — LORAZEPAM 1 MG: 2 INJECTION INTRAMUSCULAR; INTRAVENOUS at 16:43

## 2023-08-29 RX ADMIN — SODIUM CHLORIDE 1000 ML: 0.9 INJECTION, SOLUTION INTRAVENOUS at 16:06

## 2023-08-29 RX ADMIN — EPINEPHRINE 0.5 MG: 1 INJECTION, SOLUTION, CONCENTRATE INTRAVENOUS at 16:00

## 2023-08-29 NOTE — DISCHARGE INSTRUCTIONS
You have been evaluated in the Emergency Department today for an allergic reaction. You have been given medications to control your symptoms. You have been observed for several hours in the Emergency Department and you are stable for discharge at this time. You can take Benadryl and Pepcid, which are available over the counter, to help control your symptoms at home. You have been given a prescription for steroids, please take them as directed. We have also sent a prescription for an Epi-Pen to your pharmacy. Please pick it up as soon as possible. Always carry this with you. In the Emergency Department today, we spoke about how to use the Epi-Pen only in the event of a severe allergic reaction with trouble breathing or throat swelling. You must go to the hospital right away if you ever use the Epi-Pen. Remember that they  every year so you should have your doctor write a new prescription yearly. Please avoid any known triggers of your allergies. Please follow up with your primary care physician as soon as possible. If you do not have a primary care physician, you can call "[facility]" to schedule an appointment with one    There is a very small chance of a recurrence of the allergic reaction, typically in the next 24 hours. If you see the same symptoms (rash, trouble breathing, vomiting, etc) return, come back to the Emergency Department immediately. Return to the Emergency Department if you experience rashes, difficulty breathing or swallowing, lip/mouth/tongue swelling, vomiting, or for any other concerning symptoms.

## 2023-08-29 NOTE — ED PROVIDER NOTES
History  Chief Complaint   Patient presents with   • Allergic Reaction     Stung by bees in head and now having trouble breathing     Patient is a 62-year-old male, past medical history including diabetes, who presents to the emergency department after being stung by wasps. Patient was outside mowing his lawn when he got stung several times on his head. A short time later he developed hives. Initially went to urgent care where he had a syncopal episode while sitting. He has since developed trouble breathing and difficulty swallowing. He now presents for further evaluation. Per EMS patient received Solu-Medrol, breathing treatment, Benadryl, and Pepcid. He also received 0.3 mg of epinephrine. Patient continues to endorse trouble swallowing. Associated with nausea, and abdominal pain denies any chest pain. No other concerns. Prior to Admission Medications   Prescriptions Last Dose Informant Patient Reported? Taking?    ATORVASTATIN CALCIUM PO   Yes No   Sig: Take by mouth in the morning   Apixaban (ELIQUIS PO)   Yes No   Sig: Take by mouth   Patient not taking: Reported on 2/22/2023   DULoxetine (CYMBALTA) 30 mg delayed release capsule   Yes No   Sig: Take 90 mg by mouth daily   FENOFIBRATE PO   Yes No   Sig: Take 145 mg by mouth in the morning   Glucos-Giuseppe-MSM-El-G-NnFh-SeCu (DURAFLEX PO)   Yes No   Sig: Take 10 mg by mouth daily   METFORMIN HCL ER, MOD, PO   Yes No   Sig: Take 500 mg by mouth 2 (two) times a day   Melatonin 10 MG CAPS   Yes No   Sig: Take 10 mg by mouth   Metformin HCl (RIOMET) 500 MG/5ML oral solution   Yes No   Sig: every 24 hours   Paxlovid, 300/100, tablet therapy pack   Yes No   Patient not taking: Reported on 2/22/2023   TIZANIDINE HCL PO   Yes No   Sig: Take by mouth daily at bedtime   apixaban (ELIQUIS) 5 mg   Yes No   Sig: Every 12 hours   atorvastatin (LIPITOR) 10 mg tablet   Yes No   Sig: Take 1 tablet by mouth every evening   clobetasol (TEMOVATE) 0.05 % ointment No No   Sig: Apply topically 2 (two) times a day To bumps on forearms for 4 weeks   cyclobenzaprine (FLEXERIL) 10 mg tablet   Yes No   Sig: Take 10 mg by mouth 3 (three) times a day as needed for muscle spasms   fenofibrate (TRICOR) 145 mg tablet   Yes No   Sig: Take 1 tablet by mouth daily   ibuprofen (MOTRIN) 600 mg tablet   No No   Sig: Take 1 tablet (600 mg total) by mouth every 6 (six) hours as needed for moderate pain for up to 7 days   naproxen (NAPROSYN) 500 mg tablet   No No   Sig: Take 1 tablet by mouth 2 (two) times a day with meals for 10 days   oxyCODONE-acetaminophen (PERCOCET) 5-325 mg per tablet   Yes No   Sig: Take 1 tablet by mouth every 4 (four) hours as needed for moderate pain      Facility-Administered Medications: None       Past Medical History:   Diagnosis Date   • Diabetes mellitus ([address])    • Migraine        Past Surgical History:   Procedure Laterality Date   • HIP SURGERY     • KNEE SURGERY     • SHOULDER SURGERY         History reviewed. No pertinent family history. I have reviewed and agree with the history as documented. E-Cigarette/Vaping   • E-Cigarette Use Never User      E-Cigarette/Vaping Substances     Social History     Tobacco Use   • Smoking status: Every Day     Types: Cigars   • Smokeless tobacco: Never   Vaping Use   • Vaping Use: Never used   Substance Use Topics   • Alcohol use: No   • Drug use: No       Review of Systems   Constitutional: Negative for chills and fever. HENT: Positive for trouble swallowing. Skin: Positive for rash. All other systems reviewed and are negative. Physical Exam  Physical Exam  Vitals and nursing note reviewed. Constitutional:       General: He is in acute distress. Appearance: He is well-developed. He is not ill-appearing, toxic-appearing or diaphoretic. HENT:      Head: Normocephalic and atraumatic. Comments: Posterior oropharynx is clear. No stridor. No pooled secretions.      Right Ear: External ear normal. Left Ear: External ear normal.      Nose: Nose normal.   Eyes:      General: Lids are normal. No scleral icterus. Cardiovascular:      Rate and Rhythm: Normal rate and regular rhythm. Heart sounds: Normal heart sounds. No murmur heard. No friction rub. No gallop. Pulmonary:      Effort: Pulmonary effort is normal. No respiratory distress. Breath sounds: Normal breath sounds. No wheezing or rales. Abdominal:      Palpations: Abdomen is soft. Tenderness: There is no abdominal tenderness. There is no guarding or rebound. Musculoskeletal:         General: No deformity. Normal range of motion. Cervical back: Normal range of motion and neck supple. Skin:     General: Skin is warm and dry. Neurological:      General: No focal deficit present. Mental Status: He is alert.    Psychiatric:         Mood and Affect: Mood normal.         Behavior: Behavior normal.         Vital Signs  ED Triage Vitals   Temperature Pulse Respirations Blood Pressure SpO2   08/29/23 1559 08/29/23 1557 08/29/23 1557 08/29/23 1557 08/29/23 1557   97.5 °F (36.4 °C) 82 (!) 26 119/83 98 %      Temp src Heart Rate Source Patient Position - Orthostatic VS BP Location FiO2 (%)   -- 08/29/23 1630 08/29/23 1630 08/29/23 1630 --    Monitor Lying Left arm       Pain Score       08/29/23 1557       8           Vitals:    08/29/23 1700 08/29/23 1730 08/29/23 1800 08/29/23 1830   BP: 169/89 157/86 151/92 147/84   Pulse: 87 90 92 86   Patient Position - Orthostatic VS: Lying Lying Lying Lying         Visual Acuity      ED Medications  Medications   EPINEPHrine PF (ADRENALIN) 1 mg/mL injection 0.5 mg (0.5 mg Intramuscular Given 8/29/23 1600)   sodium chloride 0.9 % bolus 1,000 mL (0 mL Intravenous Stopped 8/29/23 1806)   LORazepam (ATIVAN) injection 1 mg (1 mg Intravenous Given 8/29/23 1643)       Diagnostic Studies  Results Reviewed     None                 No orders to display              Procedures  ECG 12 Lead Documentation Only    Date/Time: 8/29/2023 6:53 PM    Performed by: Chico Wyman DO  Authorized by: Chico Wyman DO    ECG reviewed by me, the ED Provider: yes    Patient location:  ED  Interpretation:     Interpretation: normal    Rate:     ECG rate:  82    ECG rate assessment: normal    Rhythm:     Rhythm: sinus rhythm    Ectopy:     Ectopy: none    QRS:     QRS axis:  Normal  Conduction:     Conduction: normal    ST segments:     ST segments:  Normal  T waves:     T waves: normal      CriticalCare Time    Date/Time: 8/29/2023 7:31 PM    Performed by: Chico Wyman DO  Authorized by: Chico Wyman DO    Critical care provider statement:     Critical care time (minutes):  36    Critical care start time:  8/29/2023 3:53 PM    Critical care end time:  8/29/2023 6:58 PM    Critical care time was exclusive of:  Separately billable procedures and treating other patients    Critical care was necessary to treat or prevent imminent or life-threatening deterioration of the following conditions: Anaphylaxis. Critical care was time spent personally by me on the following activities:  Examination of patient, evaluation of patient's response to treatment, development of treatment plan with patient or surrogate, obtaining history from patient or surrogate, re-evaluation of patient's condition and ordering and performing treatments and interventions             ED Course  ED Course as of 08/29/23 1903   Tue Aug 29, 2023   1641 Patient reevaluated. Throat feels better. However, very anxious. Will give Ativan. 1858 Patient again reevaluated. Feels much better. No new complaints. As there is no indication for further observation in the emergency department at this time will discharge. Prescription for EpiPen and steroids sent to pharmacy. Return precautions discussed. Patient verbalized understanding and agreed to plan of care.                                SBIRT 22yo+    Flowsheet Row Most Recent Value   Initial Alcohol Screen: US AUDIT-C     1. How often do you have a drink containing alcohol? 0 Filed at: 08/29/2023 1559   2. How many drinks containing alcohol do you have on a typical day you are drinking? 0 Filed at: 08/29/2023 1559   3a. Male UNDER 65: How often do you have five or more drinks on one occasion? 0 Filed at: 08/29/2023 1559   3b. FEMALE Any Age, or MALE 65+: How often do you have 4 or more drinks on one occassion? 0 Filed at: 08/29/2023 1559   Audit-C Score 0 Filed at: 08/29/2023 1559   DEVON: How many times in the past year have you. .. Used an illegal drug or used a prescription medication for non-medical reasons? Never Filed at: 08/29/2023 1559                    Medical Decision Making  Patient is a 60 y.o. male who presents to the ED for trouble breathing and swelling after being stung by multiple wasps. Patient is nontoxic, well-appearing. He is anxious. Vitals are stable. Physical exam is unremarkable. Clinical impression is anaphylaxis. Given reports of difficulty breathing and swelling will treat with another dose of epinephrine. No indication for repeat dosing of Benadryl or steroids at this time. Will monitor                 Portions of the record may have been created with voice recognition software. Occasional wrong word or "sound a like" substitutions may have occurred due to the inherent limitations of voice recognition software. Read the chart carefully and recognize, using context, where substitutions have occurred. Allergic reaction, initial encounter: acute illness or injury  Risk  Prescription drug management. Disposition  Final diagnoses:    Allergic reaction, initial encounter     Time reflects when diagnosis was documented in both MDM as applicable and the Disposition within this note     Time User Action Codes Description Comment    8/29/2023  6:49 PM Roger Whipple Add [T78.40XA] Allergic reaction, initial encounter       ED Disposition ED Disposition   Discharge    Condition   Stable    Date/Time   Tue Aug 29, 2023  6:49 PM    Comment   Ruth Osler discharge to home/self care. Follow-up Information     Follow up With Specialties Details Why Contact Info Additional [address], AP[facility] Nurse Practitioner   [facility]  [address] [facility], [address][phone]       [address] Emergency Department Emergency Medicine   [address] [address]  [address] [facility] Emergency Department, [address] [place] [address] Route , [facility], [address]          Patient's Medications   Discharge Prescriptions    EPINEPHRINE (EPIPEN 2-MADELIN) 0.3 MG/0.3 ML SOAJ    Inject 0.3 mL (0.3 mg total) into a muscle if needed for anaphylaxis       Start Date: 8/29/2023 End Date: --       Order Dose: 0.3 mg       Quantity: 0.6 mL    Refills: 0    PREDNISONE 20 MG TABLET    Take 2 tablets (40 mg total) by mouth daily for 4 days Do not start before August 30, 2023. Start Date: 8/30/2023 End Date: 9/3/2023       Order Dose: 40 mg       Quantity: 8 tablet    Refills: 0       No discharge procedures on file.     PDMP Review     None          ED Provider  Electronically Signed by           Chanel Francisco DO  08/29/23 2401 Monroe Graf DO  08/29/23 1676

## 2023-08-30 LAB
ATRIAL RATE: 82 BPM
ATRIAL RATE: 86 BPM
P AXIS: 61 DEGREES
P AXIS: 63 DEGREES
PR INTERVAL: 130 MS
PR INTERVAL: 130 MS
QRS AXIS: 54 DEGREES
QRS AXIS: 57 DEGREES
QRSD INTERVAL: 90 MS
QRSD INTERVAL: 94 MS
QT INTERVAL: 382 MS
QT INTERVAL: 390 MS
QTC INTERVAL: 455 MS
QTC INTERVAL: 457 MS
T WAVE AXIS: 66 DEGREES
T WAVE AXIS: 75 DEGREES
VENTRICULAR RATE: 82 BPM
VENTRICULAR RATE: 86 BPM

## 2023-08-30 PROCEDURE — 93010 ELECTROCARDIOGRAM REPORT: CPT | Performed by: INTERNAL MEDICINE

## 2023-09-11 ENCOUNTER — HOSPITAL ENCOUNTER (OUTPATIENT)
Dept: NON INVASIVE DIAGNOSTICS | Facility: HOSPITAL | Age: 58
Discharge: HOME | End: 2023-09-11
Payer: COMMERCIAL

## 2023-09-11 DIAGNOSIS — Z82.49 FAMILY HISTORY OF ISCHEMIC HEART DISEASE AND OTHER DISEASES OF THE CIRCULATORY SYSTEM: ICD-10-CM

## 2023-09-11 LAB
CHEST PAIN STATEMENT: NORMAL
MAX DIASTOLIC BP: 80 MMHG
MAX HEART RATE: 142 BPM
MAX PREDICTED HEART RATE: 161 BPM
MAX. SYSTOLIC BP: 184 MMHG
PROTOCOL NAME: NORMAL
REASON FOR TERMINATION: NORMAL
TARGET HR FORMULA: NORMAL
TEST INDICATION: NORMAL
TIME IN EXERCISE PHASE: NORMAL

## 2023-09-11 PROCEDURE — 93017 CV STRESS TEST TRACING ONLY: CPT

## 2023-09-11 PROCEDURE — 93016 CV STRESS TEST SUPVJ ONLY: CPT | Performed by: INTERNAL MEDICINE

## 2023-09-11 PROCEDURE — 93018 CV STRESS TEST I&R ONLY: CPT | Performed by: INTERNAL MEDICINE

## 2023-09-12 LAB
MAX HR PERCENT: 88 %
MAX HR: 142 BPM
RATE PRESSURE PRODUCT: NORMAL
SL CV STRESS RECOVERY BP: NORMAL MMHG
SL CV STRESS RECOVERY HR: 82 BPM
SL CV STRESS RECOVERY O2 SAT: 97 %
SL CV STRESS STAGE REACHED: 3
STRESS ANGINA INDEX: 0
STRESS BASELINE BP: NORMAL MMHG
STRESS BASELINE HR: 81 BPM
STRESS O2 SAT REST: 99 %
STRESS PEAK HR: 142 BPM
STRESS POST ESTIMATED WORKLOAD: 10.1 METS
STRESS POST EXERCISE DUR MIN: 8 MIN
STRESS POST O2 SAT PEAK: 98 %
STRESS POST PEAK BP: 184 MMHG

## 2024-01-24 ENCOUNTER — HOSPITAL ENCOUNTER (EMERGENCY)
Facility: HOSPITAL | Age: 59
Discharge: HOME | End: 2024-01-24
Attending: EMERGENCY MEDICINE
Payer: COMMERCIAL

## 2024-01-24 VITALS
HEART RATE: 81 BPM | OXYGEN SATURATION: 98 % | RESPIRATION RATE: 20 BRPM | TEMPERATURE: 98.2 F | DIASTOLIC BLOOD PRESSURE: 92 MMHG | SYSTOLIC BLOOD PRESSURE: 135 MMHG

## 2024-01-24 DIAGNOSIS — S61.419A HAND LACERATION: Primary | ICD-10-CM

## 2024-01-24 PROCEDURE — 12001 RPR S/N/AX/GEN/TRNK 2.5CM/<: CPT | Performed by: PHYSICIAN ASSISTANT

## 2024-01-24 PROCEDURE — 99284 EMERGENCY DEPT VISIT MOD MDM: CPT | Performed by: PHYSICIAN ASSISTANT

## 2024-01-24 PROCEDURE — 99282 EMERGENCY DEPT VISIT SF MDM: CPT

## 2024-01-24 RX ORDER — GINSENG 100 MG
1 CAPSULE ORAL ONCE
Status: COMPLETED | OUTPATIENT
Start: 2024-01-24 | End: 2024-01-24

## 2024-01-24 RX ORDER — LIDOCAINE HYDROCHLORIDE AND EPINEPHRINE 10; 10 MG/ML; UG/ML
10 INJECTION, SOLUTION INFILTRATION; PERINEURAL ONCE
Status: COMPLETED | OUTPATIENT
Start: 2024-01-24 | End: 2024-01-24

## 2024-01-24 RX ORDER — MORPHINE SULFATE 10 MG/ML
INJECTION, SOLUTION INTRAMUSCULAR; INTRAVENOUS ONCE
Status: CANCELLED | OUTPATIENT
Start: 2024-01-24 | End: 2024-01-24

## 2024-01-24 RX ADMIN — BACITRACIN 1 SMALL APPLICATION: 500 OINTMENT TOPICAL at 10:27

## 2024-01-24 RX ADMIN — LIDOCAINE HYDROCHLORIDE,EPINEPHRINE BITARTRATE 10 ML: 10; .01 INJECTION, SOLUTION INFILTRATION; PERINEURAL at 10:27

## 2024-01-24 NOTE — ED PROVIDER NOTES
History  Chief Complaint   Patient presents with    Laceration     Pt was cutting a bagel now has a lac on l hand pt up to date with tetanus     Patient is a 58-year-old white male with history of diabetes and migraine headache who reports left palmar hand laceration sustained while cutting a bagel with a kitchen knife just prior to arrival.  Patient is right-hand dominant.  He reports no numbness, tingling, weakness.  Last tetanus was 2023.  No other complaints        Prior to Admission Medications   Prescriptions Last Dose Informant Patient Reported? Taking?   ATORVASTATIN CALCIUM PO   Yes No   Sig: Take by mouth in the morning   Apixaban (ELIQUIS PO)   Yes No   Sig: Take by mouth   Patient not taking: Reported on 2/22/2023   DULoxetine (CYMBALTA) 30 mg delayed release capsule   Yes No   Sig: Take 90 mg by mouth daily   EPINEPHrine (EpiPen 2-Federico) 0.3 mg/0.3 mL SOAJ   No No   Sig: Inject 0.3 mL (0.3 mg total) into a muscle if needed for anaphylaxis   FENOFIBRATE PO   Yes No   Sig: Take 145 mg by mouth in the morning   Glucos-Giuseppe-MSM-Sq-E-DlQh-SeCu (DURAFLEX PO)   Yes No   Sig: Take 10 mg by mouth daily   METFORMIN HCL ER, MOD, PO   Yes No   Sig: Take 500 mg by mouth 2 (two) times a day   Melatonin 10 MG CAPS   Yes No   Sig: Take 10 mg by mouth   Metformin HCl (RIOMET) 500 MG/5ML oral solution   Yes No   Sig: every 24 hours   Paxlovid, 300/100, tablet therapy pack   Yes No   Patient not taking: Reported on 2/22/2023   TIZANIDINE HCL PO   Yes No   Sig: Take by mouth daily at bedtime   apixaban (ELIQUIS) 5 mg   Yes No   Sig: Every 12 hours   atorvastatin (LIPITOR) 10 mg tablet   Yes No   Sig: Take 1 tablet by mouth every evening   clobetasol (TEMOVATE) 0.05 % ointment   No No   Sig: Apply topically 2 (two) times a day To bumps on forearms for 4 weeks   cyclobenzaprine (FLEXERIL) 10 mg tablet   Yes No   Sig: Take 10 mg by mouth 3 (three) times a day as needed for muscle spasms   fenofibrate (TRICOR) 145 mg tablet    Yes No   Sig: Take 1 tablet by mouth daily   ibuprofen (MOTRIN) 600 mg tablet   No No   Sig: Take 1 tablet (600 mg total) by mouth every 6 (six) hours as needed for moderate pain for up to 7 days   naproxen (NAPROSYN) 500 mg tablet   No No   Sig: Take 1 tablet by mouth 2 (two) times a day with meals for 10 days   oxyCODONE-acetaminophen (PERCOCET) 5-325 mg per tablet   Yes No   Sig: Take 1 tablet by mouth every 4 (four) hours as needed for moderate pain      Facility-Administered Medications: None       Past Medical History:   Diagnosis Date    Diabetes mellitus (HCC)     Migraine        Past Surgical History:   Procedure Laterality Date    HIP SURGERY      KNEE SURGERY      SHOULDER SURGERY         No family history on file.  I have reviewed and agree with the history as documented.    E-Cigarette/Vaping    E-Cigarette Use Never User      E-Cigarette/Vaping Substances     Social History     Tobacco Use    Smoking status: Every Day     Types: Cigars    Smokeless tobacco: Never   Vaping Use    Vaping status: Never Used   Substance Use Topics    Alcohol use: No    Drug use: No       Review of Systems   Constitutional:  Negative for fever.   Skin:  Positive for wound. Negative for color change.   Neurological:  Negative for numbness.       Physical Exam  Physical Exam  Vitals and nursing note reviewed.   Constitutional:       General: He is not in acute distress.     Appearance: Normal appearance. He is not ill-appearing, toxic-appearing or diaphoretic.   Cardiovascular:      Rate and Rhythm: Normal rate and regular rhythm.      Pulses: Normal pulses.      Heart sounds: Normal heart sounds.   Pulmonary:      Effort: Pulmonary effort is normal.      Breath sounds: Normal breath sounds.   Skin:     General: Skin is warm and dry.      Capillary Refill: Capillary refill takes less than 2 seconds.      Comments: 2 cm linear laceration left proximal medial palmar hand.  Sensation intact motor strength intact cap refill less  "than 2 seconds in all fingers of left hand.   Neurological:      Mental Status: He is alert.         Vital Signs  ED Triage Vitals   Temperature Pulse Respirations Blood Pressure SpO2   01/24/24 1006 01/24/24 1004 01/24/24 1004 01/24/24 1006 01/24/24 1004   98.2 °F (36.8 °C) 81 20 135/92 98 %      Temp src Heart Rate Source Patient Position - Orthostatic VS BP Location FiO2 (%)   -- 01/24/24 1004 -- -- --    Monitor         Pain Score       --                  Vitals:    01/24/24 1004 01/24/24 1006   BP:  135/92   Pulse: 81          Visual Acuity      ED Medications  Medications   lidocaine-epinephrine (XYLOCAINE/EPINEPHRINE) 1 %-1:100,000 injection 10 mL (10 mL Infiltration Given 1/24/24 1027)   bacitracin topical ointment 1 small application (1 small application Topical Given 1/24/24 1027)       Diagnostic Studies  Results Reviewed       None                   No orders to display              Procedures  Universal Protocol:  Consent: Verbal consent obtained.  Risks and benefits: risks, benefits and alternatives were discussed  Consent given by: patient  Time out: Immediately prior to procedure a \"time out\" was called to verify the correct patient, procedure, equipment, support staff and site/side marked as required.  Timeout called at: 1/24/2024 11:00 AM.  Patient understanding: patient states understanding of the procedure being performed  Patient consent: the patient's understanding of the procedure matches consent given  Procedure consent: procedure consent matches procedure scheduled  Relevant documents: relevant documents present and verified  Test results: test results available and properly labeled  Site marked: the operative site was marked  Radiology Images displayed and confirmed. If images not available, report reviewed: imaging studies available  Patient identity confirmed: verbally with patient and arm band  Laceration repair    Date/Time: 1/24/2024 11:00 AM    Performed by: Mitch Brown " ELEONORA  Authorized by: Mitch Brown PA-C  Body area: upper extremity  Location details: left hand  Laceration length: 2 cm  Foreign bodies: no foreign bodies  Tendon involvement: none  Nerve involvement: none  Vascular damage: no  Anesthesia: local infiltration    Anesthesia:  Local Anesthetic: lidocaine 1% with epinephrine  Anesthetic total: 2 mL    Sedation:  Patient sedated: no      Wound Dehiscence:  Superficial Wound Dehiscence: simple closure      Procedure Details:  Preparation: Patient was prepped and draped in the usual sterile fashion.  Irrigation solution: saline  Irrigation method: syringe  Amount of cleaning: standard  Debridement: none  Degree of undermining: none  Skin closure: 4-0 nylon  Number of sutures: 4  Approximation: close  Approximation difficulty: simple  Dressing: antibiotic ointment and gauze roll  Patient tolerance: patient tolerated the procedure well with no immediate complications  Comments: Band-Aid then gauze roll               ED Course                                             Medical Decision Making  He 8-year-old white male with left hand laceration sustained cutting a bagel with a kitchen knife.  Wound was locally anesthetized with lidocaine with epinephrine.  Wound was irrigated with sterile saline.  Wound was prepped and draped in usual sterile fashion with chlorhexidine.  Wound was closed with 4 x 4 0 Ethilon sutures.  Bacitracin ointment dry sterile dressing applied.  Wound care instructions given.  Suture removal in 10 days at primary care provider.  Return precautions reviewed, including signs of wound infection.    Risk  OTC drugs.  Prescription drug management.             Disposition  Final diagnoses:   Hand laceration     Time reflects when diagnosis was documented in both MDM as applicable and the Disposition within this note       Time User Action Codes Description Comment    1/24/2024 10:48 AM Mitch Brown Add [S61.419A] Hand laceration           ED  Disposition       ED Disposition   Discharge    Condition   Stable    Date/Time   Wed Jan 24, 2024 10:47 AM    Comment   Meliton Agosto discharge to home/self care.                   Follow-up Information       Follow up With Specialties Details Why Contact Info    LIZBET Lechuga Nurse Practitioner   [address] [address]  [phone]              Patient's Medications   Discharge Prescriptions    No medications on file       No discharge procedures on file.    PDMP Review       None            ED Provider  Electronically Signed by             Mitch Brown PA-C  01/24/24 6136

## 2024-01-24 NOTE — DISCHARGE INSTRUCTIONS
Keep sutures dry when bathing.  Topical antibiotic ointment and new dressing daily.  Suture removal 10 days at primary care provider.    Return to the ED for signs of wound infection including expanding or streaking redness, purulent drainage, fever

## 2024-06-27 ENCOUNTER — HOSPITAL ENCOUNTER (EMERGENCY)
Facility: HOSPITAL | Age: 59
Discharge: ANOTHER HOSPITAL | End: 2024-06-27
Attending: EMERGENCY MEDICINE | Admitting: EMERGENCY MEDICINE
Payer: COMMERCIAL

## 2024-06-27 ENCOUNTER — APPOINTMENT (EMERGENCY)
Dept: RADIOLOGY | Facility: HOSPITAL | Age: 59
End: 2024-06-27
Payer: COMMERCIAL

## 2024-06-27 VITALS
SYSTOLIC BLOOD PRESSURE: 172 MMHG | RESPIRATION RATE: 18 BRPM | BODY MASS INDEX: 30.96 KG/M2 | WEIGHT: 209 LBS | DIASTOLIC BLOOD PRESSURE: 80 MMHG | HEIGHT: 69 IN | OXYGEN SATURATION: 94 % | HEART RATE: 86 BPM | TEMPERATURE: 97.3 F
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
## 2024-06-27 DIAGNOSIS — T30.0 BURN DUE TO CONTACT WITH HOT WATER: Primary | ICD-10-CM

## 2024-06-27 DIAGNOSIS — T31.10 BURN (ANY DEGREE) INVOLVING 10-19% OF BODY SURFACE: ICD-10-CM

## 2024-06-27 LAB
ABO GROUP BLD: NORMAL
ABO GROUP BLD: NORMAL
BLD GP AB SCN SERPL QL: NEGATIVE
RH BLD: POSITIVE
RH BLD: POSITIVE
SPECIMEN EXPIRATION DATE: NORMAL

## 2024-06-27 PROCEDURE — 86850 RBC ANTIBODY SCREEN: CPT | Performed by: EMERGENCY MEDICINE

## 2024-06-27 PROCEDURE — 71045 X-RAY EXAM CHEST 1 VIEW: CPT

## 2024-06-27 PROCEDURE — 36415 COLL VENOUS BLD VENIPUNCTURE: CPT | Performed by: EMERGENCY MEDICINE

## 2024-06-27 PROCEDURE — 99285 EMERGENCY DEPT VISIT HI MDM: CPT

## 2024-06-27 PROCEDURE — 72170 X-RAY EXAM OF PELVIS: CPT

## 2024-06-27 PROCEDURE — 96374 THER/PROPH/DIAG INJ IV PUSH: CPT

## 2024-06-27 PROCEDURE — 86900 BLOOD TYPING SEROLOGIC ABO: CPT | Performed by: EMERGENCY MEDICINE

## 2024-06-27 PROCEDURE — 86901 BLOOD TYPING SEROLOGIC RH(D): CPT | Performed by: EMERGENCY MEDICINE

## 2024-06-27 PROCEDURE — 99285 EMERGENCY DEPT VISIT HI MDM: CPT | Performed by: EMERGENCY MEDICINE

## 2024-06-27 RX ORDER — HYDROMORPHONE HCL/PF 1 MG/ML
1 SYRINGE (ML) INJECTION ONCE
Status: COMPLETED | OUTPATIENT
Start: 2024-06-27 | End: 2024-06-27

## 2024-06-27 RX ORDER — SODIUM CHLORIDE, SODIUM LACTATE, POTASSIUM CHLORIDE, CALCIUM CHLORIDE 600; 310; 30; 20 MG/100ML; MG/100ML; MG/100ML; MG/100ML
125 INJECTION, SOLUTION INTRAVENOUS CONTINUOUS
Status: DISCONTINUED | OUTPATIENT
Start: 2024-06-27 | End: 2024-06-27 | Stop reason: HOSPADM

## 2024-06-27 RX ADMIN — SODIUM CHLORIDE, SODIUM LACTATE, POTASSIUM CHLORIDE, AND CALCIUM CHLORIDE 125 ML/HR: .6; .31; .03; .02 INJECTION, SOLUTION INTRAVENOUS at 18:27

## 2024-06-27 RX ADMIN — HYDROMORPHONE HYDROCHLORIDE 1 MG: 1 INJECTION, SOLUTION INTRAMUSCULAR; INTRAVENOUS; SUBCUTANEOUS at 18:18

## 2024-06-27 RX ADMIN — HYDROMORPHONE HYDROCHLORIDE 1 MG: 1 INJECTION, SOLUTION INTRAMUSCULAR; INTRAVENOUS; SUBCUTANEOUS at 18:30
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
## 2025-03-01 ENCOUNTER — HOSPITAL ENCOUNTER (OUTPATIENT)
Dept: RADIOLOGY | Facility: HOSPITAL | Age: 60
Discharge: HOME | End: 2025-03-01
Payer: COMMERCIAL

## 2025-03-01 DIAGNOSIS — M25.562 PAIN IN LEFT KNEE: ICD-10-CM

## 2025-03-01 DIAGNOSIS — M75.21 BICIPITAL TENDINITIS, RIGHT SHOULDER: ICD-10-CM

## 2025-03-01 PROCEDURE — 73721 MRI JNT OF LWR EXTRE W/O DYE: CPT

## 2025-03-01 PROCEDURE — 73221 MRI JOINT UPR EXTREM W/O DYE: CPT

## 2025-03-03 ENCOUNTER — OFFICE VISIT (OUTPATIENT)
Dept: FAMILY MEDICINE CLINIC | Facility: CLINIC | Age: 60
End: 2025-03-03
Payer: COMMERCIAL

## 2025-03-03 VITALS
DIASTOLIC BLOOD PRESSURE: 82 MMHG | BODY MASS INDEX: 32.28 KG/M2 | TEMPERATURE: 98.1 F | SYSTOLIC BLOOD PRESSURE: 138 MMHG | HEART RATE: 81 BPM | RESPIRATION RATE: 18 BRPM | OXYGEN SATURATION: 97 % | WEIGHT: 213 LBS | HEIGHT: 68 IN

## 2025-03-03 DIAGNOSIS — Z12.5 SCREENING FOR PROSTATE CANCER: ICD-10-CM

## 2025-03-03 DIAGNOSIS — Z82.49 FAMILY HISTORY OF HEART DISEASE: ICD-10-CM

## 2025-03-03 DIAGNOSIS — E66.09 CLASS 1 OBESITY DUE TO EXCESS CALORIES WITH BODY MASS INDEX (BMI) OF 32.0 TO 32.9 IN ADULT, UNSPECIFIED WHETHER SERIOUS COMORBIDITY PRESENT: ICD-10-CM

## 2025-03-03 DIAGNOSIS — E66.811 CLASS 1 OBESITY DUE TO EXCESS CALORIES WITH BODY MASS INDEX (BMI) OF 32.0 TO 32.9 IN ADULT, UNSPECIFIED WHETHER SERIOUS COMORBIDITY PRESENT: ICD-10-CM

## 2025-03-03 DIAGNOSIS — E11.40 TYPE 2 DIABETES MELLITUS WITH DIABETIC NEUROPATHY, WITHOUT LONG-TERM CURRENT USE OF INSULIN (HCC): ICD-10-CM

## 2025-03-03 DIAGNOSIS — S46.209A INJURY OF BICEPS BRACHII MUSCLE: ICD-10-CM

## 2025-03-03 DIAGNOSIS — Z00.00 PHYSICAL EXAM: Primary | ICD-10-CM

## 2025-03-03 DIAGNOSIS — Z11.59 NEED FOR HEPATITIS C SCREENING TEST: ICD-10-CM

## 2025-03-03 DIAGNOSIS — Z86.711 HISTORY OF PULMONARY EMBOLUS (PE): ICD-10-CM

## 2025-03-03 DIAGNOSIS — S89.92XS INJURY OF LEFT KNEE, SEQUELA: ICD-10-CM

## 2025-03-03 DIAGNOSIS — M25.519 ARTHRALGIA OF SHOULDER, UNSPECIFIED LATERALITY: ICD-10-CM

## 2025-03-03 DIAGNOSIS — Z13.29 SCREENING FOR THYROID DISORDER: ICD-10-CM

## 2025-03-03 DIAGNOSIS — Z13.0 SCREENING FOR DEFICIENCY ANEMIA: ICD-10-CM

## 2025-03-03 DIAGNOSIS — E78.2 MIXED HYPERLIPIDEMIA: ICD-10-CM

## 2025-03-03 PROCEDURE — 99396 PREV VISIT EST AGE 40-64: CPT | Performed by: FAMILY MEDICINE

## 2025-03-03 PROCEDURE — 99173 VISUAL ACUITY SCREEN: CPT | Performed by: FAMILY MEDICINE

## 2025-03-03 RX ORDER — ATORVASTATIN CALCIUM 20 MG/1
20 TABLET, FILM COATED ORAL EVERY EVENING
COMMUNITY
Start: 2025-01-31

## 2025-03-03 NOTE — PROGRESS NOTES
Adult Annual Physical  Name: Meliton Agosto      : 1965      MRN: [id number]  Encounter Provider: Chari Juarez MD  Encounter Date: 3/3/2025   Encounter department: [facility]    Assessment & Plan  Physical exam    Orders:  •  Albumin / creatinine urine ratio; Future  •  Comprehensive metabolic panel; Future  •  Hemoglobin A1C; Future  •  CBC and differential; Future  •  Lipid Panel with Direct LDL reflex; Future  •  TSH, 3rd generation with Free T4 reflex; Future    Type 2 diabetes mellitus with diabetic neuropathy, without long-term current use of insulin (HCC)    Lab Results   Component Value Date    HGBA1C 7.8 (H) 2024     Orders:  •  Albumin / creatinine urine ratio; Future  •  Comprehensive metabolic panel; Future  •  Hemoglobin A1C; Future  •  Lipase; Future  •  Ambulatory Referral to Cardiology; Future    Mixed hyperlipidemia    Orders:  •  Comprehensive metabolic panel; Future  •  Lipid Panel with Direct LDL reflex; Future  •  Lipase; Future  •  Ambulatory Referral to Cardiology; Future    Injury of biceps brachii muscle         Injury of left knee, sequela         History of pulmonary embolus (PE)  Remote--pt states he completed recommended course  of Eliquis at that time  and has been off med x yrs       Arthralgia of shoulder, unspecified laterality         Need for hepatitis C screening test    Orders:  •  Hepatitis C antibody; Future    Screening for thyroid disorder    Orders:  •  TSH, 3rd generation with Free T4 reflex; Future    Screening for deficiency anemia    Orders:  •  CBC and differential; Future    Screening for prostate cancer    Orders:  •  PSA, Total Screen; Future    Family history of heart disease    Orders:  •  Ambulatory Referral to Cardiology; Future    Class 1 obesity due to excess calories with body mass index (BMI) of 32.0 to 32.9 in adult, unspecified whether serious comorbidity present    Orders:  •  Ambulatory Referral to Cardiology;  Future    Immunizations and preventive care screenings were discussed with patient today. Appropriate education was printed on patient's after visit summary.    Discussed risks and benefits of prostate cancer screening. We discussed the controversial history of PSA screening for prostate cancer in the [place] as well as the risk of over detection and over treatment of prostate cancer by way of PSA screening.  The patient understands that PSA blood testing is an imperfect way to screen for prostate cancer and that elevated PSA levels in the blood may also be caused by infection, inflammation, prostatic trauma or manipulation, urological procedures, or by benign prostatic enlargement.    The role of the digital rectal examination in prostate cancer screening was also discussed and I discussed with him that there is large interobserver variability in the findings of digital rectal examination.    Counseling:  Alcohol/drug use: discussed moderation in alcohol intake, the recommendations for healthy alcohol use, and avoidance of illicit drug use.  Dental Health: discussed importance of regular tooth brushing, flossing, and dental visits.  Injury prevention: discussed safety/seat belts, safety helmets, smoke detectors, carbon monoxide detectors, and smoking near bedding or upholstery.  Sexual health: discussed sexually transmitted diseases, partner selection, use of condoms, avoidance of unintended pregnancy, and contraceptive alternatives.  Exercise: the importance of regular exercise/physical activity was discussed. Recommend exercise 3-5 times per week for at least 30 minutes.          History of Present Illness     Adult Annual Physical  Review of Systems   Constitutional: Negative.    Respiratory: Negative.     Cardiovascular: Negative.    Gastrointestinal: Negative.    Musculoskeletal:  Positive for arthralgias and myalgias.   Neurological: Negative.    Psychiatric/Behavioral:  Positive for sleep disturbance.       Past Medical History   Past Medical History:   Diagnosis Date   • Allergic     Meds, bees   • Arthritis    • Diabetes mellitus (HCC)    • Migraine    • Obesity    • Visual impairment      Past Surgical History:   Procedure Laterality Date   • EYE SURGERY     • FRACTURE SURGERY     • HIP SURGERY     • JOINT REPLACEMENT  Hip. January 2020   • KNEE SURGERY     • SHOULDER SURGERY       Family History   Problem Relation Age of Onset   • Dementia Mother    • Cancer Mother    • Alcohol abuse Father    • Hypertension Father    • Hyperlipidemia Father    • Heart disease Father    • Substance Abuse Brother    • Diabetes Sister       reports that he has been smoking cigars. He has never used smokeless tobacco. He reports that he does not currently use alcohol. He reports that he does not currently use drugs after having used the following drugs: Cocaine.  Current Outpatient Medications   Medication Instructions   • atorvastatin (LIPITOR) 10 mg tablet 1 tablet, Every evening   • atorvastatin (LIPITOR) 20 mg, Every evening   • DULoxetine (CYMBALTA) 90 mg, Daily   • Empagliflozin (JARDIANCE) 10 mg, Every morning   • EPINEPHrine (EPIPEN 2-MADELIN) 0.3 mg, Intramuscular, As needed   • fenofibrate (TRICOR) 145 mg tablet 1 tablet, Daily   • Melatonin 10 mg     Allergies   Allergen Reactions   • Bee Venom Anaphylaxis, Confusion, Eye Swelling, Facial Swelling, Hives, Rash, Shortness Of Breath and Swelling   • Biaxin [Clarithromycin]    • Duricef [Cefadroxil]    • Penicillins Hives, Rash and Swelling      Current Outpatient Medications on File Prior to Visit   Medication Sig Dispense Refill   • atorvastatin (LIPITOR) 10 mg tablet Take 1 tablet by mouth every evening     • atorvastatin (LIPITOR) 20 mg tablet Take 20 mg by mouth every evening     • DULoxetine (CYMBALTA) 30 mg delayed release capsule Take 90 mg by mouth daily     • Empagliflozin (Jardiance) 10 MG TABS tablet Take 10 mg by mouth every morning     • EPINEPHrine (EpiPen  "2-Federico) 0.3 mg/0.3 mL SOAJ Inject 0.3 mL (0.3 mg total) into a muscle if needed for anaphylaxis 0.6 mL 0   • fenofibrate (TRICOR) 145 mg tablet Take 1 tablet by mouth daily     • Melatonin 10 MG CAPS Take 10 mg by mouth       No current facility-administered medications on file prior to visit.      Social History     Tobacco Use   • Smoking status: Every Day     Types: Cigars   • Smokeless tobacco: Never   Vaping Use   • Vaping status: Never Used   Substance and Sexual Activity   • Alcohol use: Not Currently   • Drug use: Not Currently     Types: Cocaine   • Sexual activity: Not Currently     Partners: Female     Immunization History   Administered Date(s) Administered   • COVID-19 MODERNA VACC 0.5 ML IM 04/09/2022   • INFLUENZA 01/30/2019, 11/07/2022   • Tdap 02/22/2023       Objective   /82 (BP Location: Left arm, Patient Position: Sitting, Cuff Size: Large)   Pulse 81   Temp 98.1 °F (36.7 °C) (Temporal)   Resp 18   Ht 5' 8\" (1.727 m)   Wt 96.6 kg (213 lb)   SpO2 97%   BMI 32.39 kg/m²     Physical Exam  Vitals and nursing note reviewed.   Constitutional:       General: He is not in acute distress.     Comments: OW   Eyes:      General: No scleral icterus.     Conjunctiva/sclera: Conjunctivae normal.   Cardiovascular:      Rate and Rhythm: Normal rate and regular rhythm.   Pulmonary:      Effort: Pulmonary effort is normal. No respiratory distress.      Breath sounds: Normal breath sounds.   Abdominal:      General: Bowel sounds are normal.      Palpations: Abdomen is soft.      Tenderness: There is no abdominal tenderness. There is no right CVA tenderness or left CVA tenderness.   Musculoskeletal:         General: Tenderness (right biceps; left knee) present.      Cervical back: Neck supple. No tenderness.      Right lower leg: No edema.      Left lower leg: No edema.   Skin:     General: Skin is warm and dry.      Coloration: Skin is not jaundiced.   Neurological:      General: No focal deficit " present.      Mental Status: He is alert and oriented to person, place, and time.      Cranial Nerves: No cranial nerve deficit.   Psychiatric:         Mood and Affect: Mood normal.

## 2025-04-16 NOTE — PROGRESS NOTES
Progress Note - Cardiology Office  [facility]    Meliton Agosto 59 y.o. male MRN: [id number]  : 1965  Encounter: [id number]      Assessment & Plan  Family history of heart disease    Stress test 2023  Negative for ischemia    Class 1 obesity due to excess calories without serious comorbidity with body mass index (BMI) of 32.0 to 32.9 in adult    Mixed hyperlipidemia  2023: , , HDL 44, normal AST and ALT  On atorvastatin 20 mg, fenofibrate 145 mg  2024: , , HDL 31, normal AST and ALT    Controlled type 2 diabetes mellitus without complication, unspecified whether long term insulin use ([facility])    Lab Results   Component Value Date    HGBA1C 7.8 (H) 2024     Type 2 diabetes mellitus with diabetic neuropathy, without long-term current use of insulin (HCC)    Lab Results   Component Value Date    HGBA1C 7.8 (H) 2024     Class 1 obesity due to excess calories with body mass index (BMI) of 32.0 to 32.9 in adult, unspecified whether serious comorbidity present    Essential hypertension  BP has been elevated  Today BP is 140/82 with heart rate of 64/min  Currently on no antihypertensive medication  Abnormal electrocardiogram (ECG) (EKG)       ASSESSMENT:       RECOMMENDATIONS:        Please call [phone] if any questions.    HPI :     Meliton Agosto is a 59 y.o. year old male who came for follow up. Patient has***    REVIEW OF SYSTEMS:  Review of Systems      Historical Information   Past Medical History:   Diagnosis Date    Allergic     Meds, bees    Arthritis     Diabetes mellitus (HCC)     Migraine     Obesity     Visual impairment      Past Surgical History:   Procedure Laterality Date    EYE SURGERY      FRACTURE SURGERY      HIP SURGERY      JOINT REPLACEMENT  Hip. 2020    KNEE SURGERY      SHOULDER SURGERY       Social History     Substance and Sexual Activity   Alcohol Use Not Currently     Social History     Substance and  Sexual Activity   Drug Use Not Currently    Types: Cocaine     Social History     Tobacco Use   Smoking Status Every Day    Types: Cigars   Smokeless Tobacco Never     Family History:   Family History   Problem Relation Age of Onset    Dementia Mother     Cancer Mother     Alcohol abuse Father     Hypertension Father     Hyperlipidemia Father     Heart disease Father     Substance Abuse Brother     Diabetes Sister        Meds/Allergies     Allergies   Allergen Reactions    Bee Venom Anaphylaxis, Confusion, Eye Swelling, Facial Swelling, Hives, Rash, Shortness Of Breath and Swelling    Biaxin [Clarithromycin]     Duricef [Cefadroxil]     Penicillins Hives, Rash and Swelling       Current Outpatient Medications:     atorvastatin (LIPITOR) 10 mg tablet, Take 1 tablet by mouth every evening, Disp: , Rfl:     atorvastatin (LIPITOR) 20 mg tablet, Take 20 mg by mouth every evening, Disp: , Rfl:     DULoxetine (CYMBALTA) 30 mg delayed release capsule, Take 90 mg by mouth daily, Disp: , Rfl:     Empagliflozin (Jardiance) 10 MG TABS tablet, Take 10 mg by mouth every morning, Disp: , Rfl:     EPINEPHrine (EpiPen 2-Federico) 0.3 mg/0.3 mL SOAJ, Inject 0.3 mL (0.3 mg total) into a muscle if needed for anaphylaxis, Disp: 0.6 mL, Rfl: 0    fenofibrate (TRICOR) 145 mg tablet, Take 1 tablet by mouth daily, Disp: , Rfl:     Melatonin 10 MG CAPS, Take 10 mg by mouth, Disp: , Rfl:     Vitals: There were no vitals taken for this visit.    There is no height or weight on file to calculate BMI.  There were no vitals filed for this visit.  BP Readings from Last 3 Encounters:   03/03/25 138/82   06/27/24 (!) 172/80   01/24/24 135/92       Physical Exam:  Physical Exam    Neurologic:  Alert & oriented x 3, no new focal deficits, Not in any acute distress,  Constitutional:  Well developed, well nourished, non-toxic appearance   Eyes:  Pupil equal and reacting to light, conjunctiva normal,   HENT:  Atraumatic, oropharynx moist, Neck- normal range  "of motion, no tenderness,  Neck supple, No JVP, No LNP   Respiratory:  Bilateral air entry, mostly clear to auscultation  Cardiovascular: S1-S2 regular with a I/VI systolic murmur   GI:  Soft, nondistended, normal bowel sounds, nontender, no hepatosplenomegaly appreciated.  Musculoskeletal:  No tenderness, no deformities.   Skin:  Well hydrated, no rash   Lymphatic:  No lymphadenopathy noted   Extremities:  No edema and distal pulses are present        Diagnostic Studies Review Cardio:      EKG:***    Cardiac testing:   No results found for this or any previous visit.    No results found for this or any previous visit.    No results found for this or any previous visit.    No results found for this or any previous visit.    No results found for this or any previous visit.    No results found for this or any previous visit.    No results found for this or any previous visit.    No results found for this or any previous visit.    No results found for this or any previous visit.      Imaging:  Chest X-Ray:   No Chest XR results available for this patient.    CT-scan of the chest:     No CTA results available for this patient.  Lab Review   Lab Results   Component Value Date    WBC 7.16 03/30/2023    HGB 14.3 03/30/2023    HCT 44.0 03/30/2023    MCV 85 03/30/2023    RDW 13.0 03/30/2023     03/30/2023     BMP:  Lab Results   Component Value Date    SODIUM 135 06/27/2024    K 4.1 06/27/2024     06/27/2024    CO2 24 06/27/2024    ANIONGAP 16.7 01/01/2016    BUN 17 06/27/2024    CREATININE 0.99 06/27/2024    GLUC 115 (H) 06/27/2024    GLUF 141 (H) 11/02/2022    CALCIUM 9.2 06/27/2024    EGFR 87 06/27/2024    MG 1.8 06/27/2024     LFT:  Lab Results   Component Value Date    AST 12 (L) 02/22/2023    ALT 15 02/22/2023    ALKPHOS 47 02/22/2023    TP 7.9 02/22/2023    ALB 4.7 06/27/2024      No components found for: \"TSH3\"  No results found for: \"[id number]\"  Lab Results   Component Value Date    HGBA1C 7.8 (H) " "06/27/2024     Lipid Profile:   Lab Results   Component Value Date    HDL 27 (L) 01/01/2016    LDLCALC - (AA) 01/01/2016    TRIG 694 (H) 01/01/2016     No results found for: \"CHOLESTEROL\"  Lab Results   Component Value Date    TROPONINI <0.02 01/01/2016     No results found for: \"NTBNP\"   No results found for this or any previous visit (from the past 4 weeks).          Dr. Jonn Orozco MD, FACC      \"This note has been constructed using a voice recognition system.Therefore there may be syntax, spelling, and/or grammatical errors. Please call if you have any questions. \"  "

## 2025-04-17 ENCOUNTER — CONSULT (OUTPATIENT)
Dept: CARDIOLOGY CLINIC | Facility: CLINIC | Age: 60
End: 2025-04-17
Payer: COMMERCIAL

## 2025-04-17 ENCOUNTER — TELEPHONE (OUTPATIENT)
Age: 60
End: 2025-04-17

## 2025-04-17 VITALS
HEIGHT: 68 IN | BODY MASS INDEX: 31.37 KG/M2 | SYSTOLIC BLOOD PRESSURE: 140 MMHG | OXYGEN SATURATION: 98 % | HEART RATE: 64 BPM | WEIGHT: 207 LBS | DIASTOLIC BLOOD PRESSURE: 82 MMHG

## 2025-04-17 DIAGNOSIS — E11.9 CONTROLLED TYPE 2 DIABETES MELLITUS WITHOUT COMPLICATION, UNSPECIFIED WHETHER LONG TERM INSULIN USE (HCC): ICD-10-CM

## 2025-04-17 DIAGNOSIS — I10 ESSENTIAL HYPERTENSION: ICD-10-CM

## 2025-04-17 DIAGNOSIS — R94.31 ABNORMAL ELECTROCARDIOGRAM (ECG) (EKG): ICD-10-CM

## 2025-04-17 DIAGNOSIS — E66.811 CLASS 1 OBESITY DUE TO EXCESS CALORIES WITHOUT SERIOUS COMORBIDITY WITH BODY MASS INDEX (BMI) OF 32.0 TO 32.9 IN ADULT: ICD-10-CM

## 2025-04-17 DIAGNOSIS — E66.09 CLASS 1 OBESITY DUE TO EXCESS CALORIES WITHOUT SERIOUS COMORBIDITY WITH BODY MASS INDEX (BMI) OF 32.0 TO 32.9 IN ADULT: ICD-10-CM

## 2025-04-17 DIAGNOSIS — E66.09 CLASS 1 OBESITY DUE TO EXCESS CALORIES WITH BODY MASS INDEX (BMI) OF 32.0 TO 32.9 IN ADULT, UNSPECIFIED WHETHER SERIOUS COMORBIDITY PRESENT: ICD-10-CM

## 2025-04-17 DIAGNOSIS — E66.811 CLASS 1 OBESITY DUE TO EXCESS CALORIES WITH BODY MASS INDEX (BMI) OF 32.0 TO 32.9 IN ADULT, UNSPECIFIED WHETHER SERIOUS COMORBIDITY PRESENT: ICD-10-CM

## 2025-04-17 DIAGNOSIS — E11.40 TYPE 2 DIABETES MELLITUS WITH DIABETIC NEUROPATHY, WITHOUT LONG-TERM CURRENT USE OF INSULIN (HCC): ICD-10-CM

## 2025-04-17 DIAGNOSIS — E78.2 MIXED HYPERLIPIDEMIA: ICD-10-CM

## 2025-04-17 DIAGNOSIS — Z82.49 FAMILY HISTORY OF HEART DISEASE: Primary | ICD-10-CM

## 2025-04-17 PROCEDURE — 93000 ELECTROCARDIOGRAM COMPLETE: CPT | Performed by: INTERNAL MEDICINE

## 2025-04-17 PROCEDURE — 99214 OFFICE O/P EST MOD 30 MIN: CPT | Performed by: INTERNAL MEDICINE

## 2025-04-17 RX ORDER — LOSARTAN POTASSIUM 25 MG/1
25 TABLET ORAL DAILY
Qty: 90 TABLET | Refills: 2 | Status: SHIPPED | OUTPATIENT
Start: 2025-04-17

## 2025-04-17 RX ORDER — ATORVASTATIN CALCIUM 40 MG/1
40 TABLET, FILM COATED ORAL EVERY EVENING
Qty: 90 TABLET | Refills: 2 | Status: SHIPPED | OUTPATIENT
Start: 2025-04-17

## 2025-04-17 RX ORDER — CHLORAL HYDRATE 500 MG
2000 CAPSULE ORAL 2 TIMES DAILY
Qty: 180 CAPSULE | Refills: 4 | Status: SHIPPED | OUTPATIENT
Start: 2025-04-17

## 2025-04-17 NOTE — TELEPHONE ENCOUNTER
PA for Omega-3 Fatty Acids (fish oil) 1,000 mg  SUBMITTED to [employer]    via    []CMM-KEY:    [x]Surescripts-Case ID #    []Availity-Auth ID #  NDC #    []Faxed to plan   []Other website    []Phone call Case ID #      [x]PA sent as URGENT    All office notes, labs and other pertaining documents and studies sent. Clinical questions answered. Awaiting determination from insurance company.     Turnaround time for your insurance to make a decision on your Prior Authorization can take 7-21 business days.

## 2025-04-17 NOTE — PROGRESS NOTES
Consultation - Cardiology Office  [facility].    Meliton Agosto 59 y.o. male MRN: [id number]  : 1965  Unit/Bed#:  Encounter: [id number]        Assessment & Plan  Family history of heart disease    Class 1 obesity due to excess calories without serious comorbidity with body mass index (BMI) of 32.0 to 32.9 in adult    Mixed hyperlipidemia    Controlled type 2 diabetes mellitus without complication, unspecified whether long term insulin use (HCC)    Lab Results   Component Value Date    HGBA1C 7.8 (H) 2024     Type 2 diabetes mellitus with diabetic neuropathy, without long-term current use of insulin (HCC)    Lab Results   Component Value Date    HGBA1C 7.8 (H) 2024     Class 1 obesity due to excess calories with body mass index (BMI) of 32.0 to 32.9 in adult, unspecified whether serious comorbidity present    Essential hypertension    Abnormal electrocardiogram (ECG) (EKG)      ASSESSMENT:   Family history of heart disease    Stress test 2023  Negative for ischemia    Class 1 obesity due to excess calories without serious comorbidity with body mass index (BMI) of 32.0 to 32.9 in adult  On Jardiance    Mixed hyperlipidemia  2023: , , HDL 44, normal AST and ALT  On atorvastatin 20 mg, fenofibrate 145 mg  2024: , , HDL 31, normal AST and ALT  Increase atorvastatin to 40 mg  Fish oil 2 g twice daily  Lipid panel has already been ordered about a month ago    Controlled type 2 diabetes mellitus without complication, unspecified whether long term insulin use (HCC)    Lab Results   Component Value Date    HGBA1C 7.8 (H) 2024     Type 2 diabetes mellitus with diabetic neuropathy, without long-term current use of insulin (HCC)    Lab Results   Component Value Date    HGBA1C 7.8 (H) 2024     Class 1 obesity due to excess calories with body mass index (BMI) of 32.0 to 32.9 in adult, unspecified whether serious comorbidity  present    Essential hypertension  BP has been elevated  Today BP is 140/82 with heart rate of 64/min  Currently on no antihypertensive medication  Will start on losartan 25 mg daily    Abnormal electrocardiogram (ECG) (EKG)  Nonspecific ST and T wave changes       RECOMMENDATIONS:  Increase atorvastatin to 40 mg  Fish oil 2 g twice daily  Lipid panel and CMP which was already ordered last month  Low-salt low-cholesterol diet  Losartan 25 mg daily  Regular cardiovascular exercise and weight loss  Echocardiogram  Stress test  Continue Tricor  Coronary calcium score          Thank you for your consultation.  If you have any question please call me at [phone]      Primary Care Physician Requesting Consult: Chari Juarez MD      Reason for Consult / Principal Problem: Cardiovascular risk management        HPI :     Meliton Agosto is a 59 y.o. year old male who was referred by primary care doctor for cardiovascular risk management in view of multiple CAD risk factors including diabetes mellitus, mixed hyperlipidemia, elevated blood pressure, obesity, family history of heart disease.  Patient has mixed hyperlipidemia with last lipid panel showing significantly elevated LDL and triglycerides and low HDL  His blood pressure has also been elevated on multiple occasions.  Patient is currently reluctant but has been strongly advised and prescribed antihypertensive medication.  Repeat lipid panel was ordered a month ago which she has not had done yet.  I am adding fish oil, increasing his atorvastatin, adding losartan and also ordering echocardiogram, stress test and coronary calcium score      Review of Systems  REVIEW OF SYSTEMS:      Constitutional: Negative for activity change, appetite change, chills, fatigue, fever and unexpected weight change.   HENT: Negative for congestion, sore throat and trouble swallowing.    Eyes: Negative for discharge and redness.   Respiratory: Negative for apnea, cough, chest  tightness, shortness of breath and wheezing.    Cardiovascular: Negative for chest pain, shortness of breath, palpitations and leg swelling  Gastrointestinal: Negative for abdominal distention, abdominal pain, anal bleeding, blood in stool, constipation, diarrhea, nausea and vomiting.   Endocrine: Negative for polydipsia, polyphagia and polyuria.   Genitourinary: Negative for difficulty urinating, dysuria, flank pain and hematuria.   Musculoskeletal: Negative for arthralgias, myalgias and neck stiffness.   Skin: Negative for pallor and rash.   Allergic/Immunologic: Negative for environmental allergies.   Neurological: Numbness of right leg  Hematological: Negative for adenopathy. Does not bruise/bleed easily.   Psychiatric/Behavioral: Negative for confusion and hallucinations. The patient is not nervous/anxious.      Historical Information   Past Medical History:   Diagnosis Date    Allergic     Meds, bees    Arthritis     Diabetes mellitus (HCC)     Migraine     Obesity     Visual impairment      Past Surgical History:   Procedure Laterality Date    EYE SURGERY      FRACTURE SURGERY      HIP SURGERY      JOINT REPLACEMENT  Hip. January 2020    KNEE SURGERY      SHOULDER SURGERY       Social History     Substance and Sexual Activity   Alcohol Use Not Currently     Social History     Substance and Sexual Activity   Drug Use Not Currently    Types: Cocaine     Social History     Tobacco Use   Smoking Status Every Day    Types: Cigars   Smokeless Tobacco Never     Family History:   Family History   Problem Relation Age of Onset    Dementia Mother     Cancer Mother     Alcohol abuse Father     Hypertension Father     Hyperlipidemia Father     Heart disease Father     Substance Abuse Brother     Diabetes Sister        Meds/Allergies     Allergies   Allergen Reactions    Bee Venom Anaphylaxis, Confusion, Eye Swelling, Facial Swelling, Hives, Rash, Shortness Of Breath and Swelling    Biaxin [Clarithromycin]     Duricef  "[Cefadroxil]     Penicillins Hives, Rash and Swelling       Current Outpatient Medications:     atorvastatin (LIPITOR) 40 mg tablet, Take 1 tablet (40 mg total) by mouth every evening, Disp: 90 tablet, Rfl: 2    Omega-3 Fatty Acids (fish oil) 1,000 mg, Take 2 capsules (2,000 mg total) by mouth 2 (two) times a day, Disp: 180 capsule, Rfl: 4    DULoxetine (CYMBALTA) 30 mg delayed release capsule, Take 90 mg by mouth daily, Disp: , Rfl:     Empagliflozin (Jardiance) 10 MG TABS tablet, Take 10 mg by mouth every morning, Disp: , Rfl:     EPINEPHrine (EpiPen 2-Federico) 0.3 mg/0.3 mL SOAJ, Inject 0.3 mL (0.3 mg total) into a muscle if needed for anaphylaxis, Disp: 0.6 mL, Rfl: 0    fenofibrate (TRICOR) 145 mg tablet, Take 1 tablet by mouth daily, Disp: , Rfl:     Melatonin 10 MG CAPS, Take 10 mg by mouth, Disp: , Rfl:     Vitals: Blood pressure 140/82, pulse 64, height 5' 8\" (1.727 m), weight 93.9 kg (207 lb), SpO2 98%.    Body mass index is 31.47 kg/m².  Vitals:    04/17/25 0916   Weight: 93.9 kg (207 lb)     BP Readings from Last 3 Encounters:   04/17/25 140/82   03/03/25 138/82   06/27/24 (!) 172/80       Physical Exam  PHYSICAL EXAMINATION:  Neurologic:  Alert & oriented x 3, no new focal deficits, Not in any acute distress,  Constitutional: Obese  Eyes:  Pupil equal and reacting to light, conjunctiva normal, No JVP, No LNP   HENT:  Atraumatic, oropharynx moist, Neck- normal range of motion, no tenderness,  Neck supple   Respiratory:  Bilateral air entry, mostly clear to auscultation  Cardiovascular: S1-S2 regular with a I/VI systolic murmur   GI:  Soft, nondistended, normal bowel sounds, nontender, no hepatosplenomegaly appreciated.  Musculoskeletal: no tenderness, no deformities.   Skin:  Well hydrated, no rash   Lymphatic:  No lymphadenopathy noted   Extremities:  No edema     Diagnostic Studies Review Cardio:      EKG: Normal sinus rhythm with sinus arrhythmia, heart rate 64/min, ST and T wave abnormality    Cardiac " "testing:   No results found for this or any previous visit.      Imaging:  Chest X-Ray:   No Chest XR results available for this patient.    CT-scan of the chest:     No CTA results available for this patient.  Lab Review   Lab Results   Component Value Date    WBC 7.16 03/30/2023    HGB 14.3 03/30/2023    HCT 44.0 03/30/2023    MCV 85 03/30/2023    RDW 13.0 03/30/2023     03/30/2023     BMP:  Lab Results   Component Value Date    SODIUM 135 06/27/2024    K 4.1 06/27/2024     06/27/2024    CO2 24 06/27/2024    ANIONGAP 16.7 01/01/2016    BUN 17 06/27/2024    CREATININE 0.99 06/27/2024    GLUC 115 (H) 06/27/2024    GLUF 141 (H) 11/02/2022    CALCIUM 9.2 06/27/2024    EGFR 87 06/27/2024    MG 1.8 06/27/2024     LFT:  Lab Results   Component Value Date    AST 12 (L) 02/22/2023    ALT 15 02/22/2023    ALKPHOS 47 02/22/2023    TP 7.9 02/22/2023    ALB 4.7 06/27/2024      No results found for: \"[id number]\"  No components found for: \"TSH3\"  Lab Results   Component Value Date    HGBA1C 7.8 (H) 06/27/2024     Lipid Profile:   Lab Results   Component Value Date    HDL 27 (L) 01/01/2016    LDLCALC - (AA) 01/01/2016    TRIG 694 (H) 01/01/2016     No results found for: \"CHOLESTEROL\"  Lab Results   Component Value Date    TROPONINI <0.02 01/01/2016     No results found for: \"NTBNP\"   No results found for this or any previous visit (from the past 4 weeks).        Dr. Jonn Orozco MD, FACC      \"This note has been constructed using a voice recognition system.Therefore there may be syntax, spelling, and/or grammatical errors. Please call if you have any questions. \"  "

## 2025-04-18 NOTE — TELEPHONE ENCOUNTER
A user error has taken place: encounter opened in error, closed for administrative reasons.      
No

## 2025-04-18 NOTE — TELEPHONE ENCOUNTER
PA for OMEGA  DENIED-EXCLUDED    Reason:        Message sent to office clinical pool Yes    Denial letter scanned into Media Yes    Appeal started No (Provider will need to decide if appeal is warranted and send clinical documentation to Prior Authorization Team for initiation.)    **Please follow up with your patient regarding denial and next steps**

## 2025-05-05 ENCOUNTER — TELEPHONE (OUTPATIENT)
Age: 60
End: 2025-05-05

## 2025-05-05 NOTE — TELEPHONE ENCOUNTER
Meliton called to ask Dr. Juarez to give him a call back to discuss the medication     DULoxetine (CYMBALTA) 30 mg delayed release capsule     He would like to go to the lower dose of the medication to see how that goes

## 2025-05-06 ENCOUNTER — HOSPITAL ENCOUNTER (OUTPATIENT)
Dept: NON INVASIVE DIAGNOSTICS | Facility: HOSPITAL | Age: 60
Discharge: HOME | End: 2025-05-06
Attending: INTERNAL MEDICINE
Payer: COMMERCIAL

## 2025-05-06 ENCOUNTER — RESULTS FOLLOW-UP (OUTPATIENT)
Dept: CARDIOLOGY CLINIC | Facility: CLINIC | Age: 60
End: 2025-05-06

## 2025-05-06 VITALS
HEART RATE: 78 BPM | DIASTOLIC BLOOD PRESSURE: 80 MMHG | OXYGEN SATURATION: 97 % | SYSTOLIC BLOOD PRESSURE: 134 MMHG | RESPIRATION RATE: 20 BRPM

## 2025-05-06 VITALS
DIASTOLIC BLOOD PRESSURE: 82 MMHG | WEIGHT: 207 LBS | HEART RATE: 64 BPM | SYSTOLIC BLOOD PRESSURE: 140 MMHG | HEIGHT: 68 IN | BODY MASS INDEX: 31.37 KG/M2

## 2025-05-06 DIAGNOSIS — E11.40 TYPE 2 DIABETES MELLITUS WITH DIABETIC NEUROPATHY, WITHOUT LONG-TERM CURRENT USE OF INSULIN (HCC): ICD-10-CM

## 2025-05-06 DIAGNOSIS — I10 ESSENTIAL HYPERTENSION: ICD-10-CM

## 2025-05-06 DIAGNOSIS — E78.2 MIXED HYPERLIPIDEMIA: ICD-10-CM

## 2025-05-06 DIAGNOSIS — Z82.49 FAMILY HISTORY OF HEART DISEASE: ICD-10-CM

## 2025-05-06 DIAGNOSIS — R94.31 ABNORMAL ELECTROCARDIOGRAM (ECG) (EKG): ICD-10-CM

## 2025-05-06 LAB
AORTIC ROOT: 4 CM
ASCENDING AORTA: 2.5 CM
AV LVOT PEAK GRADIENT: 4 MMHG
AV PEAK GRADIENT: 4 MMHG
BSA FOR ECHO PROCEDURE: 2.07 M2
CHEST PAIN STATEMENT: NORMAL
DOP CALC LVOT AREA: 4.15 CM2
DOP CALC LVOT DIAMETER: 2.3 CM
E WAVE DECELERATION TIME: 137 MS
E/A RATIO: 0.87
FRACTIONAL SHORTENING: 35 (ref 28–44)
INTERVENTRICULAR SEPTUM IN DIASTOLE (PARASTERNAL SHORT AXIS VIEW): 1.4 CM
INTERVENTRICULAR SEPTUM: 1.4 CM (ref 0.6–1.1)
LAAS-AP2: 12 CM2
LAAS-AP4: 16.8 CM2
LEFT ATRIUM SIZE: 2.7 CM
LEFT ATRIUM VOLUME (MOD BIPLANE): 42 ML
LEFT ATRIUM VOLUME INDEX (MOD BIPLANE): 20.3 ML/M2
LEFT INTERNAL DIMENSION IN SYSTOLE: 3.2 CM (ref 2.1–4)
LEFT VENTRICULAR INTERNAL DIMENSION IN DIASTOLE: 4.9 CM (ref 3.5–6)
LEFT VENTRICULAR POSTERIOR WALL IN END DIASTOLE: 0.9 CM
LEFT VENTRICULAR STROKE VOLUME: 74 ML
LV EF US.2D.A4C+ESTIMATED: 59 %
LVSV (TEICH): 74 ML
MAX DIASTOLIC BP: 80 MMHG
MAX HR PERCENT: 86 %
MAX HR: 139 BPM
MAX PREDICTED HEART RATE: 160 BPM
MV E'TISSUE VEL-SEP: 9 CM/S
MV PEAK A VEL: 0.71 M/S
MV PEAK E VEL: 62 CM/S
MV STENOSIS PRESSURE HALF TIME: 40 MS
MV VALVE AREA P 1/2 METHOD: 5.5
PROTOCOL NAME: NORMAL
RATE PRESSURE PRODUCT: NORMAL
REASON FOR TERMINATION: NORMAL
RIGHT ATRIUM AREA SYSTOLE A4C: 11.4 CM2
RIGHT VENTRICLE ID DIMENSION: 3 CM
SL CV LEFT ATRIUM LENGTH A2C: 3.5 CM
SL CV LV EF: 55
SL CV PED ECHO LEFT VENTRICLE DIASTOLIC VOLUME (MOD BIPLANE) 2D: 115 ML
SL CV PED ECHO LEFT VENTRICLE SYSTOLIC VOLUME (MOD BIPLANE) 2D: 41 ML
SL CV STRESS RECOVERY BP: NORMAL MMHG
SL CV STRESS RECOVERY HR: 80 BPM
SL CV STRESS RECOVERY O2 SAT: 98 %
SL CV STRESS STAGE REACHED: 2
STRESS ANGINA INDEX: 0
STRESS BASELINE BP: NORMAL MMHG
STRESS BASELINE HR: 78 BPM
STRESS O2 SAT REST: 78 %
STRESS PEAK HR: 126 BPM
STRESS POST ESTIMATED WORKLOAD: 9 METS
STRESS POST EXERCISE DUR MIN: 6 MIN
STRESS POST EXERCISE DUR MIN: 6 MIN
STRESS POST EXERCISE DUR SEC: 39 SEC
STRESS POST EXERCISE DUR SEC: 39 SEC
STRESS POST O2 SAT PEAK: 98 %
STRESS POST PEAK BP: 180 MMHG
STRESS POST PEAK HR: 139 BPM
STRESS POST PEAK SYSTOLIC BP: 180 MMHG
TARGET HR FORMULA: NORMAL
TEST INDICATION: NORMAL
TRICUSPID ANNULAR PLANE SYSTOLIC EXCURSION: 2.3 CM

## 2025-05-06 PROCEDURE — 93306 TTE W/DOPPLER COMPLETE: CPT | Performed by: INTERNAL MEDICINE

## 2025-05-06 PROCEDURE — 93017 CV STRESS TEST TRACING ONLY: CPT

## 2025-05-06 PROCEDURE — 93306 TTE W/DOPPLER COMPLETE: CPT

## 2025-05-06 PROCEDURE — 93016 CV STRESS TEST SUPVJ ONLY: CPT | Performed by: INTERNAL MEDICINE

## 2025-05-06 PROCEDURE — 93018 CV STRESS TEST I&R ONLY: CPT | Performed by: INTERNAL MEDICINE

## 2025-05-06 NOTE — TELEPHONE ENCOUNTER
----- Message from ESSENCE Weiner sent at 5/6/2025  1:17 PM EDT -----  Please let patient know his 2D echocardiogram was normal. Valvular structure is intact and no valvular disease. Normal ejection fraction.

## 2025-08-13 ENCOUNTER — TELEPHONE (OUTPATIENT)
Dept: PHYSICAL THERAPY | Facility: CLINIC | Age: 60
End: 2025-08-13

## 2025-08-15 ENCOUNTER — TELEPHONE (OUTPATIENT)
Dept: PHYSICAL THERAPY | Facility: CLINIC | Age: 60
End: 2025-08-15